# Patient Record
Sex: MALE | Race: WHITE | Employment: FULL TIME | URBAN - METROPOLITAN AREA
[De-identification: names, ages, dates, MRNs, and addresses within clinical notes are randomized per-mention and may not be internally consistent; named-entity substitution may affect disease eponyms.]

---

## 2017-05-03 ENCOUNTER — TELEPHONE (OUTPATIENT)
Dept: SURGERY | Age: 57
End: 2017-05-03

## 2017-05-03 NOTE — TELEPHONE ENCOUNTER
Received referral notes from Dr. Chon Johansen. Called and spoke to Mr. Marilee Carr regarding setting up appointment for evaluation of hernia. He is going to call back this afternoon to schedule.

## 2017-08-11 NOTE — TELEPHONE ENCOUNTER
Requested Prescriptions     Pending Prescriptions Disp Refills    valsartan-hydroCHLOROthiazide (DIOVAN-HCT) 320-25 mg per tablet [Pharmacy Med Name: VALSARTAN-HCTZ 320-25 MG TAB] 30 Tab 1     Sig: TAKE ONE TABLET BY MOUTH DAILY       Last Refill: 5-17-17  Last MZSXV:8-33-99

## 2017-08-13 RX ORDER — VALSARTAN AND HYDROCHLOROTHIAZIDE 320; 25 MG/1; MG/1
TABLET, FILM COATED ORAL
Qty: 30 TAB | Refills: 1 | Status: SHIPPED | OUTPATIENT
Start: 2017-08-13 | End: 2017-10-17 | Stop reason: SDUPTHER

## 2017-09-21 PROBLEM — I10 ESSENTIAL HYPERTENSION: Status: ACTIVE | Noted: 2017-09-21

## 2017-09-21 PROBLEM — K76.0 NAFLD (NONALCOHOLIC FATTY LIVER DISEASE): Status: ACTIVE | Noted: 2017-09-21

## 2017-09-21 PROBLEM — M79.7 FIBROMYALGIA: Status: ACTIVE | Noted: 2017-09-21

## 2017-09-21 PROBLEM — E78.5 DYSLIPIDEMIA: Status: ACTIVE | Noted: 2017-09-21

## 2017-09-21 PROBLEM — K63.5 COLORECTAL POLYPS: Status: ACTIVE | Noted: 2017-09-21

## 2017-09-21 PROBLEM — K21.00 GERD WITH ESOPHAGITIS: Status: ACTIVE | Noted: 2017-09-21

## 2017-09-21 PROBLEM — K62.1 COLORECTAL POLYPS: Status: ACTIVE | Noted: 2017-09-21

## 2017-09-21 PROBLEM — R79.89 LOW TESTOSTERONE: Status: ACTIVE | Noted: 2017-09-21

## 2017-09-21 PROBLEM — G43.909 MIGRAINE HEADACHE: Status: ACTIVE | Noted: 2017-09-21

## 2017-09-25 RX ORDER — BUPROPION HYDROCHLORIDE 150 MG/1
150 TABLET ORAL
COMMUNITY
End: 2018-07-09 | Stop reason: ALTCHOICE

## 2017-09-25 RX ORDER — AMLODIPINE BESYLATE 5 MG/1
5 TABLET ORAL DAILY
COMMUNITY
End: 2018-05-29 | Stop reason: SDUPTHER

## 2017-09-25 RX ORDER — AMITRIPTYLINE HYDROCHLORIDE 50 MG/1
TABLET, FILM COATED ORAL
COMMUNITY
End: 2017-10-03 | Stop reason: SDUPTHER

## 2017-09-29 ENCOUNTER — OFFICE VISIT (OUTPATIENT)
Dept: INTERNAL MEDICINE CLINIC | Age: 57
End: 2017-09-29

## 2017-09-29 VITALS
WEIGHT: 172 LBS | TEMPERATURE: 97.3 F | BODY MASS INDEX: 26.07 KG/M2 | HEIGHT: 68 IN | SYSTOLIC BLOOD PRESSURE: 126 MMHG | DIASTOLIC BLOOD PRESSURE: 76 MMHG

## 2017-09-29 DIAGNOSIS — J01.00 ACUTE MAXILLARY SINUSITIS, RECURRENCE NOT SPECIFIED: ICD-10-CM

## 2017-09-29 DIAGNOSIS — M79.7 FIBROMYALGIA: Primary | ICD-10-CM

## 2017-09-29 RX ORDER — AZITHROMYCIN 250 MG/1
250 TABLET, FILM COATED ORAL SEE ADMIN INSTRUCTIONS
Qty: 6 TAB | Refills: 0 | Status: SHIPPED | OUTPATIENT
Start: 2017-09-29 | End: 2017-11-22 | Stop reason: ALTCHOICE

## 2017-09-29 NOTE — PROGRESS NOTES
Shira Alberto is a 59 y.o. male presenting for Sinus Infection  . 1. Have you been to the ER, urgent care clinic since your last visit? Hospitalized since your last visit? No    2. Have you seen or consulted any other health care providers outside of the [facility] since your last visit? Include any pap smears or colon screening. No    No flowsheet data found. No flowsheet data found. PHQ over the last two weeks 9/29/2017   Little interest or pleasure in doing things Not at all   Feeling down, depressed or hopeless Not at all   Total Score PHQ 2 0       There are no discontinued medications.

## 2017-09-29 NOTE — PATIENT INSTRUCTIONS
Fibromyalgia: Care Instructions  Your Care Instructions  Fibromyalgia is a painful condition that is not completely understood by medical experts. The cause of fibromyalgia is not known. It can make you feel tired and ache all over. It causes tender spots at specific points of the body that hurt only when you press on them. You may have trouble sleeping, as well as other symptoms. These problems can upset your work and home life. Symptoms tend to come and go, although they may never go away completely. Fibromyalgia does not harm your muscles, joints, or organs. Follow-up care is a key part of your treatment and safety. Be sure to make and go to all appointments, and call your doctor if you are having problems. It's also a good idea to know your test results and keep a list of the medicines you take. How can you care for yourself at home? · Exercise often. Walk, swim, or bike to help with pain and sleep problems and to make you feel better. · Try to get a good night's sleep. Go to bed and get up at the same time each day, whether you feel rested or not. Make sure you have a good mattress and pillow. · Reduce stress. Avoid things that cause you stress, if you can. If not, work at making them less stressful. Learn to use biofeedback, guided imagery, meditation, or other methods to relax. · Make healthy changes. Eat a balanced diet, quit smoking, and limit alcohol and caffeine. · Use a heating pad set on low or take warm baths or showers for pain. Using cold packs for up to 20 minutes at a time can also relieve pain. Put a thin cloth between the cold pack and your skin. A gentle massage might help too. · Be safe with medicines. Take your medicines exactly as prescribed. Call your doctor if you think you are having a problem with your medicine. Your doctor may talk to you about taking antidepressant medicines. These medicines may improve sleep, relieve pain, and in some cases treat depression.   · Learn about fibromyalgia. This makes coping easier. Then, take an active role in your treatment. · Think about joining a support group with others who have fibromyalgia to learn more and get support. When should you call for help? Watch closely for changes in your health, and be sure to contact your doctor if:  · You feel sad, helpless, or hopeless; lose interest in things you used to enjoy; or have other symptoms of depression. · Your fibromyalgia symptoms get worse. Where can you learn more? Go to http://vargas-tod.info/. Enter V003 in the search box to learn more about \"Fibromyalgia: Care Instructions. \"  Current as of: October 14, 2016  Content Version: 11.3  © 4848-9262 [employer]. Care instructions adapted under license by [employer] (which disclaims liability or warranty for this information). If you have questions about a medical condition or this instruction, always ask your healthcare professional. [address] any warranty or liability for your use of this information.

## 2017-09-29 NOTE — MR AVS SNAPSHOT
Visit Information Date & Time Provider Department Dept. Phone Encounter #  
 9/29/2017  8:40 AM Arron Gaines, Edgar Trujillo [id number] Follow-up Instructions Return for As previously scheduled. Upcoming Health Maintenance Date Due Hepatitis C Screening 1960 DTaP/Tdap/Td series (1 - Tdap) 8/18/1979 FOBT Q 1 YEAR AGE 50-75 8/18/2008 INFLUENZA AGE 9 TO ADULT 8/1/2017 Allergies as of 9/29/2017  Review Complete On: 9/29/2017 By: Arron Gaines MD  
  
 Severity Noted Reaction Type Reactions Penicillins  09/25/2017    Unknown (comments) Current Immunizations  Never Reviewed Name Date  
 TB Skin Test (PPD) 8/12/2014 Not reviewed this visit You Were Diagnosed With   
  
 Codes Comments Fibromyalgia    -  Primary ICD-10-CM: M79.7 ICD-9-CM: 729.1 Acute maxillary sinusitis, recurrence not specified     ICD-10-CM: J01.00 ICD-9-CM: 461.0 Vitals BP Temp Height(growth percentile) Weight(growth percentile) BMI Smoking Status 126/76 (BP 1 Location: Right arm, BP Patient Position: Sitting) 97.3 °F (36.3 °C) (Oral) 5' 8\" (1.727 m) 172 lb (78 kg) 26.15 kg/m2 Never Smoker BMI and BSA Data Body Mass Index Body Surface Area  
 26.15 kg/m 2 1.93 m 2 Preferred Pharmacy Pharmacy Name Phone [facility] [address] [phone] Your Updated Medication List  
  
   
This list is accurate as of: 9/29/17  8:41 AM.  Always use your most recent med list.  
  
  
  
  
 amitriptyline 50 mg tablet Commonly known as:  ELAVIL Take  by mouth nightly. azithromycin 250 mg tablet Commonly known as:  Maria E Maudlin Take 1 Tab by mouth See Admin Instructions. milnacipran 12.5 mg (5)-25 mg(8)-50 mg(42) Dspk Commonly known as:  Sanju Josue Take 1 Package by mouth daily. NORVASC 5 mg tablet Generic drug:  amLODIPine Take 5 mg by mouth daily. valsartan-hydroCHLOROthiazide 320-25 mg per tablet Commonly known as:  DIOVAN-HCT  
TAKE ONE TABLET BY MOUTH DAILY WELLBUTRIN  mg tablet Generic drug:  buPROPion XL Take 150 mg by mouth every morning. Prescriptions Sent to Pharmacy Refills  
 milnacipran (SAVELLA) 12.5 mg (5)-25 mg(8)-50 mg(42) DsPk 0 Sig: Take 1 Package by mouth daily. Class: Normal  
 Pharmacy: [address] [place] [address]  [address] Ph #: [phone] Route: Oral  
 azithromycin (ZITHROMAX) 250 mg tablet 0 Sig: Take 1 Tab by mouth See Admin Instructions. Class: Normal  
 Pharmacy: [address] [place] [address]  [address] Ph #: [phone] Route: Oral  
  
Follow-up Instructions Return for As previously scheduled. Patient Instructions Fibromyalgia: Care Instructions Your Care Instructions Fibromyalgia is a painful condition that is not completely understood by medical experts. The cause of fibromyalgia is not known. It can make you feel tired and ache all over. It causes tender spots at specific points of the body that hurt only when you press on them. You may have trouble sleeping, as well as other symptoms. These problems can upset your work and home life. Symptoms tend to come and go, although they may never go away completely. Fibromyalgia does not harm your muscles, joints, or organs. Follow-up care is a key part of your treatment and safety. Be sure to make and go to all appointments, and call your doctor if you are having problems. It's also a good idea to know your test results and keep a list of the medicines you take. How can you care for yourself at home? · Exercise often. Walk, swim, or bike to help with pain and sleep problems and to make you feel better. · Try to get a good night's sleep. Go to bed and get up at the same time each day, whether you feel rested or not. Make sure you have a good mattress and pillow. · Reduce stress. Avoid things that cause you stress, if you can. If not, work at making them less stressful. Learn to use biofeedback, guided imagery, meditation, or other methods to relax. · Make healthy changes. Eat a balanced diet, quit smoking, and limit alcohol and caffeine. · Use a heating pad set on low or take warm baths or showers for pain. Using cold packs for up to 20 minutes at a time can also relieve pain. Put a thin cloth between the cold pack and your skin. A gentle massage might help too. · Be safe with medicines. Take your medicines exactly as prescribed. Call your doctor if you think you are having a problem with your medicine. Your doctor may talk to you about taking antidepressant medicines. These medicines may improve sleep, relieve pain, and in some cases treat depression. · Learn about fibromyalgia. This makes coping easier. Then, take an active role in your treatment. · Think about joining a support group with others who have fibromyalgia to learn more and get support. When should you call for help? Watch closely for changes in your health, and be sure to contact your doctor if: 
· You feel sad, helpless, or hopeless; lose interest in things you used to enjoy; or have other symptoms of depression. · Your fibromyalgia symptoms get worse. Where can you learn more? Go to http://vargas-tod.info/. Enter V003 in the search box to learn more about \"Fibromyalgia: Care Instructions. \" Current as of: October 14, 2016 Content Version: 11.3 © 8484-4712 [employer]. Care instructions adapted under license by [employer] (which disclaims liability or warranty for this information).  If you have questions about a medical condition or this instruction, always ask your healthcare professional. [place] [address] any warranty or liability for your use of this information. Introducing [facility]! Jaye Larkin introduces [employer] patient portal. Now you can access parts of your medical record, email your doctor's office, and request medication refills online. 1. In your internet browser, go to [email or web address] 2. Click on the First Time User? Click Here link in the Sign In box. You will see the New Member Sign Up page. 3. Enter your [employer] Access Code exactly as it appears below. You will not need to use this code after youve completed the sign-up process. If you do not sign up before the expiration date, you must request a new code. · [employer] Access Code: [email or web address] Expires: 12/28/2017  8:23 AM 
 
4. Enter the last four digits of your Social Security Number (xxxx) and Date of Birth (mm/dd/yyyy) as indicated and click Submit. You will be taken to the next sign-up page. 5. Create a [employer] ID. This will be your [employer] login ID and cannot be changed, so think of one that is secure and easy to remember. 6. Create a [employer] password. You can change your password at any time. 7. Enter your Password Reset Question and Answer. This can be used at a later time if you forget your password. 8. Enter your e-mail address. You will receive e-mail notification when new information is available in [address] 9. Click Sign Up. You can now view and download portions of your medical record. 10. Click the Download Summary menu link to download a portable copy of your medical information. If you have questions, please visit the Frequently Asked Questions section of the [employer] website. Remember, [employer] is NOT to be used for urgent needs. For medical emergencies, dial 911. Now available from your iPhone and Android! Please provide this summary of care documentation to your next provider. Your primary care clinician is listed as INOCENTE Lynn. If you have any questions after today's visit, please call [phone].

## 2017-09-29 NOTE — PROGRESS NOTES
This note will not be viewable in [address] Burton Painting is a 59 y.o. male and presents with Sinus Infection  . Subjective:  Jerrica Martino presents to the office today with complaints of an upper respiratory infection with development of sinus congestion, maxillary discomfort and purulent sinus drainage. He has had some mild retro-orbital headaches. He denies sore throat or cough. He has had no fever or chills. There is been no neck stiffness or rash. Patient also complains of fibromyalgia. This is been a long standing problem that he has had and he had been on medication for this in the past.  The patient failed all the old traditional medications for fibromyalgia but did find great response with Savella. He was on this for a number of years until approximately 2 years ago when he lost his job and his insurance. He then was on [employer] ([employer]) and was unable to get [employer] through that insurance. The patient is now working for [employer] and has [employer] which he would like to get started back on it because he has pain \"everywhere\". The patient is never found as good a relief with any other medication at this point. Patient Active Problem List   Diagnosis Code    Colorectal polyps K63.5    Dyslipidemia E78.5    GERD with esophagitis K21.0    Fibromyalgia M79.7    Essential hypertension I10    Low testosterone E29.1    Migraine headache G43.909    NAFLD (nonalcoholic fatty liver disease) K76.0     Past Surgical History:   Procedure Laterality Date    ABDOMEN SURGERY PROC UNLISTED      HX APPENDECTOMY      HX COLONOSCOPY       Allergies   Allergen Reactions    Penicillins Unknown (comments)     Current Outpatient Prescriptions   Medication Sig Dispense Refill    milnacipran (SAVELLA) 12.5 mg (5)-25 mg(8)-50 mg(42) DsPk Take 1 Package by mouth daily. 1 Package 0    azithromycin (ZITHROMAX) 250 mg tablet Take 1 Tab by mouth See Admin Instructions.  6 Tab 0    amLODIPine (NORVASC) 5 mg tablet Take 5 mg by mouth daily.  amitriptyline (ELAVIL) 50 mg tablet Take  by mouth nightly.  buPROPion XL (WELLBUTRIN XL) 150 mg tablet Take 150 mg by mouth every morning.  valsartan-hydroCHLOROthiazide (DIOVAN-HCT) 320-25 mg per tablet TAKE ONE TABLET BY MOUTH DAILY 30 Tab 1     Social History     Social History    Marital status:      Spouse name: N/A    Number of children: N/A    Years of education: N/A     Social History Main Topics    Smoking status: Never Smoker    Smokeless tobacco: Never Used    Alcohol use No    Drug use: None    Sexual activity: Not Asked     Other Topics Concern    None     Social History Narrative     Family History   Problem Relation Age of Onset    Heart Disease Father     Heart Disease Paternal Grandfather        Review of Systems  Constitutional: negative for fevers, chills, anorexia and weight loss  Eyes:   negative for visual disturbance and irritation  ENT:   Positive for sinus congestion, maxillary discomfort, purulent psotnasal draingage and throat irritation  Respiratory:  negative for cough, hemoptysis, dyspnea,wheezing  CV:   negative for chest pain, palpitations, lower extremity edema  GI:   negative for nausea, vomiting, diarrhea, abdominal pain,melena  Integumentary: negative for rash and pruritus  Neurological:  negative for headaches, dizziness, vertigo, memory problems and gait       Objective:  Visit Vitals    /76 (BP 1 Location: Right arm, BP Patient Position: Sitting)    Temp 97.3 °F (36.3 °C) (Oral)    Ht 5' 8\" (1.727 m)    Wt 172 lb (78 kg)    BMI 26.15 kg/m2     Body mass index is 26.15 kg/(m^2). Physical Exam:   General appearance - alert, well appearing, and in no distress  Mental status - alert, oriented to person, place, and time  EYE-TRISTON, EOMI, sclera clear. No lid swelling or purulent drainage  ENT- TM's clear without A/F level.  Pharynx slightly erythematous with drainage noted  Nose - normal and patent, no erythema,  Neck - supple, no significant adenopathy   Chest - clear to auscultation, no wheezes, rales or rhonchi, symmetric air entry   Heart - normal rate, regular rhythm, normal S1, S2, no murmurs, rubs, clicks or gallops   Skin-No rash appreciated  Neuro -alert, oriented, normal speech, no focal findings. Assessment/Plan:  Diagnoses and all orders for this visit:    Fibromyalgia  -     milnacipran (SAVELLA) 12.5 mg (5)-25 mg(8)-50 mg(42) DsPk; Take 1 Package by mouth daily. , Normal, Disp-1 Package, R-0    Acute maxillary sinusitis, recurrence not specified  -     azithromycin (ZITHROMAX) 250 mg tablet; Take 1 Tab by mouth See Admin Instructions. , Normal, Disp-6 Tab, R-0        Other Instructions:  Mucinex as directed    Increase po fluids    Will restart Savella titration pack    Follow-up Disposition:  Return for As previously scheduled. I have reviewed with the patient details of the assessment and plan and all questions were answered. Relevent patient education was performed. An After Visit Summary was printed and given to the patient.     Aide Alvarado MD

## 2017-10-03 RX ORDER — AMITRIPTYLINE HYDROCHLORIDE 50 MG/1
TABLET, FILM COATED ORAL
Qty: 30 TAB | Refills: 11 | Status: SHIPPED | OUTPATIENT
Start: 2017-10-03 | End: 2018-10-10 | Stop reason: SDUPTHER

## 2017-10-03 NOTE — TELEPHONE ENCOUNTER
Requested Prescriptions     Pending Prescriptions Disp Refills    amitriptyline (ELAVIL) 50 mg tablet [Pharmacy Med Name: AMITRIPTYLINE HCL 50 MG TAB] 30 Tab 11     Sig: TAKE 1 TABLET BY MOUTH AT BEDTIME       Last Refill: 9/7/17  Last visit:9/29/2017

## 2017-10-17 RX ORDER — VALSARTAN AND HYDROCHLOROTHIAZIDE 320; 25 MG/1; MG/1
TABLET, FILM COATED ORAL
Qty: 30 TAB | Refills: 0 | Status: SHIPPED | OUTPATIENT
Start: 2017-10-17 | End: 2017-11-20 | Stop reason: SDUPTHER

## 2017-10-17 NOTE — TELEPHONE ENCOUNTER
Requested Prescriptions     Pending Prescriptions Disp Refills    valsartan-hydroCHLOROthiazide (DIOVAN-HCT) 320-25 mg per tablet [Pharmacy Med Name: VALSARTAN-HCTZ 320-25 MG TAB] 30 Tab 0     Sig: TAKE ONE TABLET BY MOUTH DAILY       Last Refill: 9-14-17  Last visit:9/29/2017

## 2017-11-20 RX ORDER — VALSARTAN AND HYDROCHLOROTHIAZIDE 320; 25 MG/1; MG/1
TABLET, FILM COATED ORAL
Qty: 30 TAB | Refills: 0 | Status: SHIPPED | OUTPATIENT
Start: 2017-11-20 | End: 2017-12-20 | Stop reason: SDUPTHER

## 2017-11-20 NOTE — TELEPHONE ENCOUNTER
Requested Prescriptions     Pending Prescriptions Disp Refills    valsartan-hydroCHLOROthiazide (DIOVAN-HCT) 320-25 mg per tablet [Pharmacy Med Name: VALSARTAN-HCTZ 320-25 MG TAB] 30 Tab 0     Sig: TAKE ONE TABLET BY MOUTH DAILY       Last Refill:10-17-17  Last visit:9/29/2017

## 2017-11-22 ENCOUNTER — OFFICE VISIT (OUTPATIENT)
Dept: INTERNAL MEDICINE CLINIC | Age: 57
End: 2017-11-22

## 2017-11-22 VITALS
DIASTOLIC BLOOD PRESSURE: 81 MMHG | OXYGEN SATURATION: 97 % | HEART RATE: 93 BPM | WEIGHT: 171 LBS | HEIGHT: 68 IN | RESPIRATION RATE: 16 BRPM | TEMPERATURE: 98.2 F | BODY MASS INDEX: 25.91 KG/M2 | SYSTOLIC BLOOD PRESSURE: 117 MMHG

## 2017-11-22 DIAGNOSIS — D17.1 LIPOMA OF CHEST WALL: Primary | ICD-10-CM

## 2017-11-22 NOTE — MR AVS SNAPSHOT
Visit Information Date & Time Provider Department Dept. Phone Encounter #  
 11/22/2017  9:10 AM Ty Barnhart, Edgar Trujillo [id number] Follow-up Instructions Return for As previously scheduled. Upcoming Health Maintenance Date Due Hepatitis C Screening 1960 DTaP/Tdap/Td series (1 - Tdap) 8/18/1979 FOBT Q 1 YEAR AGE 50-75 8/18/2008 Influenza Age 5 to Adult 8/1/2017 Allergies as of 11/22/2017  Review Complete On: 11/22/2017 By: Ty Barnhart MD  
  
 Severity Noted Reaction Type Reactions Penicillins  09/25/2017    Unknown (comments) Current Immunizations  Never Reviewed Name Date Influenza Vaccine (Quadrivalent) 10/25/2017 TB Skin Test (PPD) 8/12/2014 Not reviewed this visit You Were Diagnosed With   
  
 Codes Comments Lipoma of chest wall    -  Primary ICD-10-CM: D17.39 
ICD-9-CM: 214.8 Vitals BP Pulse Temp Resp Height(growth percentile) Weight(growth percentile) 117/81 (BP 1 Location: Left arm, BP Patient Position: Sitting) 93 98.2 °F (36.8 °C) (Oral) 16 5' 8\" (1.727 m) 171 lb (77.6 kg) SpO2 BMI Smoking Status 97% 26 kg/m2 Never Smoker Vitals History BMI and BSA Data Body Mass Index Body Surface Area  
 26 kg/m 2 1.93 m 2 Preferred Pharmacy Pharmacy Name Phone [facility]ie Antoine [address] [phone] Your Updated Medication List  
  
   
This list is accurate as of: 11/22/17  9:12 AM.  Always use your most recent med list.  
  
  
  
  
 amitriptyline 50 mg tablet Commonly known as:  ELAVIL TAKE 1 TABLET BY MOUTH AT BEDTIME Milnacipran 50 mg tablet Commonly known as:  Marrian Yenny Take 1 Tab by mouth two (2) times a day. NORVASC 5 mg tablet Generic drug:  amLODIPine Take 5 mg by mouth daily. valsartan-hydroCHLOROthiazide 320-25 mg per tablet Commonly known as:  DIOVAN-HCT  
TAKE ONE TABLET BY MOUTH DAILY WELLBUTRIN  mg tablet Generic drug:  buPROPion XL Take 150 mg by mouth every morning. Follow-up Instructions Return for As previously scheduled. Introducing [facility]! Margarita Donald introduces [employer] patient portal. Now you can access parts of your medical record, email your doctor's office, and request medication refills online. 1. In your internet browser, go to [email or web address] 2. Click on the First Time User? Click Here link in the Sign In box. You will see the New Member Sign Up page. 3. Enter your [employer] Access Code exactly as it appears below. You will not need to use this code after youve completed the sign-up process. If you do not sign up before the expiration date, you must request a new code. · [employer] Access Code: [email or web address] Expires: 12/28/2017  7:23 AM 
 
4. Enter the last four digits of your Social Security Number (xxxx) and Date of Birth (mm/dd/yyyy) as indicated and click Submit. You will be taken to the next sign-up page. 5. Create a [employer] ID. This will be your [employer] login ID and cannot be changed, so think of one that is secure and easy to remember. 6. Create a [employer] password. You can change your password at any time. 7. Enter your Password Reset Question and Answer. This can be used at a later time if you forget your password. 8. Enter your e-mail address. You will receive e-mail notification when new information is available in [address] 9. Click Sign Up. You can now view and download portions of your medical record. 10. Click the Download Summary menu link to download a portable copy of your medical information. If you have questions, please visit the Frequently Asked Questions section of the [employer] website.  Remember, [employer] is NOT to be used for urgent needs. For medical emergencies, dial 911. Now available from your iPhone and Android! Please provide this summary of care documentation to your next provider. Your primary care clinician is listed as INOCENTE Lynn. If you have any questions after today's visit, please call [phone].

## 2017-11-22 NOTE — PROGRESS NOTES
This note will not be viewable in [address] Subjective:     Patient presents to the office today with complaints of a small mass along the right lateral chest wall that his wife recently noticed. He has never seen it there before. He is having no pain    Past Medical History:   Diagnosis Date    Colorectal polyps 9/21/2017    Dyslipidemia 9/21/2017    Essential hypertension 9/21/2017    Fibromyalgia 9/21/2017    GERD with esophagitis 9/21/2017    Low testosterone 9/21/2017    Migraine headache 9/21/2017    NAFLD (nonalcoholic fatty liver disease) 9/21/2017     Past Surgical History:   Procedure Laterality Date    ABDOMEN SURGERY PROC UNLISTED      HX APPENDECTOMY      HX COLONOSCOPY       Allergies   Allergen Reactions    Penicillins Unknown (comments)     Current Outpatient Prescriptions   Medication Sig Dispense Refill    valsartan-hydroCHLOROthiazide (DIOVAN-HCT) 320-25 mg per tablet TAKE ONE TABLET BY MOUTH DAILY 30 Tab 0    Milnacipran (SAVELLA) 50 mg tablet Take 1 Tab by mouth two (2) times a day. 60 Tab 3    amitriptyline (ELAVIL) 50 mg tablet TAKE 1 TABLET BY MOUTH AT BEDTIME 30 Tab 11    amLODIPine (NORVASC) 5 mg tablet Take 5 mg by mouth daily.  buPROPion XL (WELLBUTRIN XL) 150 mg tablet Take 150 mg by mouth every morning. Social History     Social History    Marital status:      Spouse name: N/A    Number of children: N/A    Years of education: N/A     Social History Main Topics    Smoking status: Never Smoker    Smokeless tobacco: Never Used    Alcohol use No    Drug use: None    Sexual activity: Not Asked     Other Topics Concern    None     Social History Narrative     Family History   Problem Relation Age of Onset    Heart Disease Father     Heart Disease Paternal Grandfather        Review of Systems:  GEN: no weight loss, weight gain, fatigue or night sweats  CV: no PND, orthopnea, or palpitations.  Positive for right chest wall mass  Resp: no dyspnea on exertion, no cough  Abd: no nausea, vomiting or diarrhea    ROS otherwise negative      Objective:     Visit Vitals    /81 (BP 1 Location: Left arm, BP Patient Position: Sitting)    Pulse 93    Temp 98.2 °F (36.8 °C) (Oral)    Resp 16    Ht 5' 8\" (1.727 m)    Wt 171 lb (77.6 kg)    SpO2 97%    BMI 26 kg/m2     Body mass index is 26 kg/(m^2). General:   alert, cooperative and no distress   Heart: S1 and S2 normal,no murmurs noted. There is a soft, rubbery marble sized soft tissue mass along the right lateral chest wall which is freely movable and nontender. Lungs: Clear to auscultation bilaterally, no increased work of breathing   Abdomen: Soft, nontender. Normal bowel sounds     Physical exam otherwise negative         Assessment/Plan:     Diagnoses and all orders for this visit:    Lipoma of chest wall        Other instructions: The patient is reassured that this is a benign lesion. I have instructed him to keep an eye on it and should it show signs of increasing in size or irritation will refer to surgery for excision    Follow-up Disposition:  Return for As previously scheduled.     Keren Ramirez MD

## 2017-12-20 RX ORDER — VALSARTAN AND HYDROCHLOROTHIAZIDE 320; 25 MG/1; MG/1
TABLET, FILM COATED ORAL
Qty: 30 TAB | Refills: 0 | Status: SHIPPED | OUTPATIENT
Start: 2017-12-20 | End: 2018-01-24 | Stop reason: SDUPTHER

## 2017-12-20 NOTE — TELEPHONE ENCOUNTER
Requested Prescriptions     Pending Prescriptions Disp Refills    valsartan-hydroCHLOROthiazide (DIOVAN-HCT) 320-25 mg per tablet [Pharmacy Med Name: VALSARTAN-HCTZ 320-25 MG TAB] 30 Tab 0     Sig: TAKE ONE TABLET BY MOUTH DAILY       Last Refill: 11-20-17  Last visit:11/22/2017

## 2018-03-02 ENCOUNTER — OFFICE VISIT (OUTPATIENT)
Dept: INTERNAL MEDICINE CLINIC | Age: 58
End: 2018-03-02

## 2018-03-02 VITALS
DIASTOLIC BLOOD PRESSURE: 70 MMHG | WEIGHT: 173.2 LBS | TEMPERATURE: 98.1 F | HEIGHT: 68 IN | SYSTOLIC BLOOD PRESSURE: 118 MMHG | BODY MASS INDEX: 26.25 KG/M2

## 2018-03-02 DIAGNOSIS — H69.83 DYSFUNCTION OF BOTH EUSTACHIAN TUBES: Primary | ICD-10-CM

## 2018-03-02 RX ORDER — FLUTICASONE PROPIONATE 50 MCG
2 SPRAY, SUSPENSION (ML) NASAL DAILY
Qty: 1 BOTTLE | Refills: 0
Start: 2018-03-02 | End: 2018-07-09 | Stop reason: ALTCHOICE

## 2018-03-02 RX ORDER — PSEUDOEPHEDRINE HCL 120 MG/1
120 TABLET, FILM COATED, EXTENDED RELEASE ORAL DAILY
Qty: 15 TAB | Refills: 0
Start: 2018-03-02 | End: 2018-07-09 | Stop reason: ALTCHOICE

## 2018-03-02 RX ORDER — OXYMETAZOLINE HCL 0.05 %
2 SPRAY, NON-AEROSOL (ML) NASAL 2 TIMES DAILY
Qty: 1 EACH | Refills: 0
Start: 2018-03-02 | End: 2018-03-05

## 2018-03-02 NOTE — PATIENT INSTRUCTIONS
Middle Ear Fluid: Care Instructions  Your Care Instructions    Fluid often builds up inside the ear during a cold or allergies. Usually the fluid drains away, but sometimes a small tube in the ear, called the eustachian tube, stays blocked for months. Symptoms of fluid buildup may include:  · Popping, ringing, or a feeling of fullness or pressure in the ear. · Trouble hearing. · Balance problems and dizziness. In most cases, you can treat yourself at home. Follow-up care is a key part of your treatment and safety. Be sure to make and go to all appointments, and call your doctor if you are having problems. It's also a good idea to know your test results and keep a list of the medicines you take. How can you care for yourself at home? · In most cases, the fluid clears up within a few months without treatment. You may need more tests if the fluid does not clear up after 3 months. · If your doctor prescribed antibiotics, take them as directed. Do not stop taking them just because you feel better. You need to take the full course of antibiotics. When should you call for help? Call your doctor now or seek immediate medical care if:  ? · You have symptoms of infection, such as:  ¨ Increased pain, swelling, warmth, or redness. ¨ Pus draining from the area. ¨ A fever. ? Watch closely for changes in your health, and be sure to contact your doctor if:  ? · You notice changes in hearing. ? · You do not get better as expected. Where can you learn more? Go to http://vargas-tod.info/. Enter W155 in the search box to learn more about \"Middle Ear Fluid: Care Instructions. \"  Current as of: May 12, 2017  Content Version: 11.4  © 8384-0844 [employer]. Care instructions adapted under license by [employer] (which disclaims liability or warranty for this information).  If you have questions about a medical condition or this instruction, always ask your healthcare professional. [address] any warranty or liability for your use of this information.

## 2018-03-02 NOTE — PROGRESS NOTES
This note will not be viewable in [address] Subjective: The patient presents to the office today with complaints of in the ability to hear out of his right greater than left ear. Patient's history is remarkable for an upper respiratory infection with sinus congestion and drainage [facility] which [facility] Z-Tung on February 20. Patient was having problems with his right ear at that time as well he did not improve over a 3 day timeframe and he was subsequently placed on Omnicef. He has continued that until now and he is still having problems with right greater than left hearing. He is noted no tinnitus but is been having some vertigo. He denies any fevers chills or headaches. Past Medical History:   Diagnosis Date    Colorectal polyps 9/21/2017    Dyslipidemia 9/21/2017    Essential hypertension 9/21/2017    Fibromyalgia 9/21/2017    GERD with esophagitis 9/21/2017    Low testosterone 9/21/2017    Migraine headache 9/21/2017    NAFLD (nonalcoholic fatty liver disease) 9/21/2017     Past Surgical History:   Procedure Laterality Date    ABDOMEN SURGERY PROC UNLISTED      HX APPENDECTOMY      HX COLONOSCOPY       Allergies   Allergen Reactions    Penicillins Unknown (comments)     Current Outpatient Prescriptions   Medication Sig Dispense Refill    pseudoephedrine CR (SUDAFED 12 HOUR) 120 mg CR tablet Take 1 Tab by mouth daily. 15 Tab 0    fluticasone (FLONASE) 50 mcg/actuation nasal spray 2 Sprays by Both Nostrils route daily. 1 Bottle 0    oxymetazoline (AFRIN, OXYMETAZOLINE,) 0.05 % nasal spray 2 Sprays by Both Nostrils route two (2) times a day for 3 days. 1 Each 0    valsartan-hydroCHLOROthiazide (DIOVAN-HCT) 320-25 mg per tablet TAKE ONE TABLET BY MOUTH DAILY 30 Tab 3    Milnacipran (SAVELLA) 50 mg tablet Take 1 Tab by mouth two (2) times a day.  60 Tab 3    amitriptyline (ELAVIL) 50 mg tablet TAKE 1 TABLET BY MOUTH AT BEDTIME 30 Tab 11    amLODIPine (NORVASC) 5 mg tablet Take 5 mg by mouth daily.  buPROPion XL (WELLBUTRIN XL) 150 mg tablet Take 150 mg by mouth every morning. Social History     Social History    Marital status:      Spouse name: N/A    Number of children: N/A    Years of education: N/A     Social History Main Topics    Smoking status: Never Smoker    Smokeless tobacco: Never Used    Alcohol use No    Drug use: None    Sexual activity: Not Asked     Other Topics Concern    None     Social History Narrative     Family History   Problem Relation Age of Onset    Heart Disease Father     Heart Disease Paternal Grandfather        Review of Systems:  GEN: no weight loss, weight gain, fatigue or night sweats  ENT: Complains of hearing loss right greater than left along with vertigo  CV: no PND, orthopnea, or palpitations  Resp: no dyspnea on exertion, no cough  Abd: no nausea, vomiting or diarrhea  EXT: denies edema, claudication  Endocrine: no hair loss, excessive thirst or polyuria  Neurological ROS: no TIA or stroke symptoms  ROS otherwise negative      Objective:     Visit Vitals    /70 (BP 1 Location: Right arm, BP Patient Position: Sitting)    Temp 98.1 °F (36.7 °C) (Oral)    Ht 5' 8\" (1.727 m)    Wt 173 lb 3.2 oz (78.6 kg)    BMI 26.33 kg/m2     Body mass index is 26.33 kg/(m^2). General:   alert, cooperative and no distress   ENT:  Both ear canals are clear. The TMs are not inflamed. There is fluid behind the right TM and the left TM is retracted. Mouth:  No oral lesions, no pharyngeal erythema, no exudates   Neck: Trachea midline, no thyromegaly, no bruits   Heart: S1 and S2 normal,no murmurs noted    Lungs: Clear to auscultation bilaterally, no increased work of breathing   Abdomen: Soft, nontender.   Normal bowel sounds   Extremities: No edema or cyanosis   Neuro: ..alert, oriented x3,speech normal in context and clarity, cranial nerves II-XII intact,motor strength: full proximally and distally,gait: normal  reflexes: full and symmetric     Physical exam otherwise negative         Assessment/Plan:     Diagnoses and all orders for this visit:    Dysfunction of both eustachian tubes  -     pseudoephedrine CR (SUDAFED 12 HOUR) 120 mg CR tablet; Take 1 Tab by mouth daily. , No Print, Disp-15 Tab, R-0  -     fluticasone (FLONASE) 50 mcg/actuation nasal spray; 2 Sprays by Both Nostrils route daily. , No Print, Disp-1 Bottle, R-0  -     oxymetazoline (AFRIN, OXYMETAZOLINE,) 0.05 % nasal spray; 2 Sprays by Both Nostrils route two (2) times a day for 3 days. , No Print, Disp-1 Each, R-0        Other instructions: The patient's ears do not appear infected. I believe he has eustachian tube dysfunction. He will start on a 12 hour Sudafed and use Afrin nasal spray twice a day for the first 3 days. In addition he will start Flonase on a twice daily basis. After a day or 2 I have asked him to try to clear his ears by holding his nose and blowing. If there is no improvement of the above we will give him a short course of oral prednisone    He is to return if there is any worsening. Follow-up Disposition:  Return if symptoms worsen or fail to improve.     Rosy Ludwig MD

## 2018-03-02 NOTE — MR AVS SNAPSHOT
[address] [place] [address] [address] [id number] [phone] Patient: Vasyl Gamez MRN: [id number] [facility] Files Visit Information Date & Time Provider Department Dept. Phone Encounter #  
 3/2/2018  9:30 AM Edgar Beaulieu Margoth Trujillo [id number] Follow-up Instructions Return if symptoms worsen or fail to improve. Upcoming Health Maintenance Date Due Hepatitis C Screening 1960 DTaP/Tdap/Td series (1 - Tdap) 8/18/1979 FOBT Q 1 YEAR AGE 50-75 8/18/2008 Allergies as of 3/2/2018  Review Complete On: 3/2/2018 By: Jhonny Parmar MD  
  
 Severity Noted Reaction Type Reactions Penicillins  09/25/2017    Unknown (comments) Current Immunizations  Never Reviewed Name Date Influenza Vaccine (Quadrivalent) 10/25/2017 TB Skin Test (PPD) 8/12/2014 Not reviewed this visit You Were Diagnosed With   
  
 Codes Comments Dysfunction of both eustachian tubes    -  Primary ICD-10-CM: T39.73 ICD-9-CM: 381.81 Vitals BP Temp Height(growth percentile) Weight(growth percentile) BMI Smoking Status 118/70 (BP 1 Location: Right arm, BP Patient Position: Sitting) 98.1 °F (36.7 °C) (Oral) 5' 8\" (1.727 m) 173 lb 3.2 oz (78.6 kg) 26.33 kg/m2 Never Smoker BMI and BSA Data Body Mass Index Body Surface Area  
 26.33 kg/m 2 1.94 m 2 Preferred Pharmacy Pharmacy Name Phone Lisbet Felix [address] [place] [address] Dr [place], [address] [place] [address] Mike Chino [phone] Your Updated Medication List  
  
   
This list is accurate as of 3/2/18  9:58 AM.  Always use your most recent med list.  
  
  
  
  
 amitriptyline 50 mg tablet Commonly known as:  ELAVIL TAKE 1 TABLET BY MOUTH AT BEDTIME  
  
 fluticasone 50 mcg/actuation nasal spray Commonly known as:  Myna Heller 2 Sprays by Both Nostrils route daily. Milnacipran 50 mg tablet Commonly known as:  Gagandeep Yarelis Take 1 Tab by mouth two (2) times a day. NORVASC 5 mg tablet Generic drug:  amLODIPine Take 5 mg by mouth daily. oxymetazoline 0.05 % nasal spray Commonly known as:  AFRIN (OXYMETAZOLINE) 2 Sprays by Both Nostrils route two (2) times a day for 3 days. pseudoephedrine  mg CR tablet Commonly known as:  SUDAFED 12 HOUR Take 1 Tab by mouth daily. valsartan-hydroCHLOROthiazide 320-25 mg per tablet Commonly known as:  DIOVAN-HCT  
TAKE ONE TABLET BY MOUTH DAILY WELLBUTRIN  mg tablet Generic drug:  buPROPion XL Take 150 mg by mouth every morning. Follow-up Instructions Return if symptoms worsen or fail to improve. Introducing [facility]! [facility] introduces [employer] patient portal. Now you can access parts of your medical record, email your doctor's office, and request medication refills online. 1. In your internet browser, go to [email or web address] 2. Click on the First Time User? Click Here link in the Sign In box. You will see the New Member Sign Up page. 3. Enter your [employer] Access Code exactly as it appears below. You will not need to use this code after youve completed the sign-up process. If you do not sign up before the expiration date, you must request a new code. · [employer] Access Code: [email or web address] Expires: 5/31/2018  9:29 AM 
 
4. Enter the last four digits of your Social Security Number (xxxx) and Date of Birth (mm/dd/yyyy) as indicated and click Submit. You will be taken to the next sign-up page. 5. Create a [employer] ID. This will be your [employer] login ID and cannot be changed, so think of one that is secure and easy to remember. 6. Create a [employer] password. You can change your password at any time. 7. Enter your Password Reset Question and Answer. This can be used at a later time if you forget your password. 8. Enter your e-mail address. You will receive e-mail notification when new information is available in [address] 9. Click Sign Up. You can now view and download portions of your medical record. 10. Click the Download Summary menu link to download a portable copy of your medical information. If you have questions, please visit the Frequently Asked Questions section of the [employer] website. Remember, [employer] is NOT to be used for urgent needs. For medical emergencies, dial 911. Now available from your iPhone and Android! Please provide this summary of care documentation to your next provider. Your primary care clinician is listed as INOCENTE Morris 21. If you have any questions after today's visit, please call [phone].

## 2018-03-02 NOTE — PROGRESS NOTES
Yumiko Peterson is a 59 y.o. male presenting for Ear Fullness  . 1. Have you been to the ER, urgent care clinic since your last visit? Hospitalized since your last visit? Yes When: 2-20-18 & 2-23-18 [facility] Reason for visit: ear infection. 2. Have you seen or consulted any other health care providers outside of the [address] [place] [address] since your last visit? Include any pap smears or colon screening. No    No flowsheet data found. No flowsheet data found. PHQ over the last two weeks 9/29/2017   Little interest or pleasure in doing things Not at all   Feeling down, depressed or hopeless Not at all   Total Score PHQ 2 0       There are no discontinued medications.

## 2018-03-08 ENCOUNTER — TELEPHONE (OUTPATIENT)
Dept: INTERNAL MEDICINE CLINIC | Age: 58
End: 2018-03-08

## 2018-03-16 ENCOUNTER — TELEPHONE (OUTPATIENT)
Dept: INTERNAL MEDICINE CLINIC | Age: 58
End: 2018-03-16

## 2018-03-16 DIAGNOSIS — H92.09 OTALGIA, UNSPECIFIED LATERALITY: Primary | ICD-10-CM

## 2018-03-16 NOTE — TELEPHONE ENCOUNTER
Jose Guzman called stating he is continuing to have ear pain & discomfort. He is now ready to speak to Dr. Eliana Goodman about going to see an ENT. Patient may be reached at [phone] (h).

## 2018-05-29 RX ORDER — AMLODIPINE BESYLATE 5 MG/1
TABLET ORAL
Qty: 30 TAB | Refills: 2 | Status: SHIPPED | OUTPATIENT
Start: 2018-05-29 | End: 2018-08-29 | Stop reason: SDUPTHER

## 2018-05-29 RX ORDER — VALSARTAN AND HYDROCHLOROTHIAZIDE 320; 25 MG/1; MG/1
TABLET, FILM COATED ORAL
Qty: 30 TAB | Refills: 2 | Status: SHIPPED | OUTPATIENT
Start: 2018-05-29 | End: 2018-08-30 | Stop reason: SDUPTHER

## 2018-06-18 RX ORDER — BUPROPION HYDROCHLORIDE 150 MG/1
TABLET, EXTENDED RELEASE ORAL
Qty: 60 TAB | Refills: 2 | Status: SHIPPED | OUTPATIENT
Start: 2018-06-18 | End: 2018-10-08 | Stop reason: SDUPTHER

## 2018-07-09 ENCOUNTER — OFFICE VISIT (OUTPATIENT)
Dept: INTERNAL MEDICINE CLINIC | Age: 58
End: 2018-07-09

## 2018-07-09 VITALS
TEMPERATURE: 98 F | DIASTOLIC BLOOD PRESSURE: 70 MMHG | OXYGEN SATURATION: 97 % | BODY MASS INDEX: 26.46 KG/M2 | WEIGHT: 174.6 LBS | HEIGHT: 68 IN | HEART RATE: 102 BPM | SYSTOLIC BLOOD PRESSURE: 118 MMHG

## 2018-07-09 DIAGNOSIS — M79.7 FIBROMYALGIA: Primary | ICD-10-CM

## 2018-07-09 NOTE — PATIENT INSTRUCTIONS
Fibromyalgia: Care Instructions Your Care Instructions Fibromyalgia is a painful condition that is not completely understood by medical experts. The cause of fibromyalgia is not known. It can make you feel tired and ache all over. It causes tender spots at specific points of the body that hurt only when you press on them. You may have trouble sleeping, as well as other symptoms. These problems can upset your work and home life. Symptoms tend to come and go, although they may never go away completely. Fibromyalgia does not harm your muscles, joints, or organs. Follow-up care is a key part of your treatment and safety. Be sure to make and go to all appointments, and call your doctor if you are having problems. It's also a good idea to know your test results and keep a list of the medicines you take. How can you care for yourself at home? · Exercise often. Walk, swim, or bike to help with pain and sleep problems and to make you feel better. · Try to get a good night's sleep. Go to bed and get up at the same time each day, whether you feel rested or not. Make sure you have a good mattress and pillow. · Reduce stress. Avoid things that cause you stress, if you can. If not, work at making them less stressful. Learn to use biofeedback, guided imagery, meditation, or other methods to relax. · Make healthy changes. Eat a balanced diet, quit smoking, and limit alcohol and caffeine. · Use a heating pad set on low or take warm baths or showers for pain. Using cold packs for up to 20 minutes at a time can also relieve pain. Put a thin cloth between the cold pack and your skin. A gentle massage might help too. · Be safe with medicines. Take your medicines exactly as prescribed. Call your doctor if you think you are having a problem with your medicine. Your doctor may talk to you about taking antidepressant medicines. These medicines may improve sleep, relieve pain, and in some cases treat depression.  
· Learn about fibromyalgia. This makes coping easier. Then, take an active role in your treatment. · Think about joining a support group with others who have fibromyalgia to learn more and get support. When should you call for help? Watch closely for changes in your health, and be sure to contact your doctor if: 
? · You feel sad, helpless, or hopeless; lose interest in things you used to enjoy; or have other symptoms of depression. ? · Your fibromyalgia symptoms get worse. Where can you learn more? Go to http://vargas-tod.info/. Enter V003 in the search box to learn more about \"Fibromyalgia: Care Instructions. \" Current as of: October 14, 2016 Content Version: 11.4 © 6927-7731 [employer]. Care instructions adapted under license by [employer] (which disclaims liability or warranty for this information). If you have questions about a medical condition or this instruction, always ask your healthcare professional. [address] any warranty or liability for your use of this information. Learning About Cutting Calories How do calories affect your weight? Food gives your body energy. Energy from the food you eat is measured in calories. This energy keeps your heart beating, your brain active, and your muscles working. Your body needs a certain number of calories each day. After your body uses the calories it needs, it stores extra calories as fat. To lose weight safely, you have to eat fewer calories while eating in a healthy way. How many calories do you need each day? The more active you are, the more calories you need. When you are less active, you need fewer calories. How many calories you need each day also depends on several things, including your age and whether you are male or female. Here are some general guidelines for adults: 
· Less active women and older adults need 1,600 to 2,000 calories each day.  
· Active women and less active men need 2,000 to 2,400 calories each day. · Active men need 2,400 to 3,000 calories each day. How can you cut calories and eat healthy meals? Whole grains, vegetables and fruits, and dried beans are good lower-calorie foods. They give you lots of nutrients and fiber. And they fill you up. Sweets, energy drinks, and soda pop are high in calories. They give you few nutrients and no fiber. Try to limit soda pop, fruit juice, and energy drinks. Drink water instead. Some fats can be part of a healthy diet. But cutting back on fats from highly processed foods like fast foods and many snack foods is a good way to lower the calories in your diet. Also, use smaller amounts of fats like butter, margarine, salad dressing, and mayonnaise. Add fresh garlic, lemon, or herbs to your meals to add flavor without adding fat. Meats and dairy products can be a big source of hidden fats. Try to choose lean or low-fat versions of these products. Fat-free cookies, candies, chips, and frozen treats can still be high in sugar and calories. Some fat-free foods have more calories than regular ones. Eat fat-free treats in moderation, as you would other foods. If your favorite foods are high in fat, salt, sugar, or calories, limit how often you eat them. Eat smaller servings, or look for healthy substitutes. Fill up on fruits, vegetables, and whole grains. Eating at home · Use meat as a side dish instead of as the main part of your meal. 
· Try main dishes that use whole wheat pasta, brown rice, dried beans, or vegetables. · Find ways to cook with little or no fat, such as broiling, steaming, or grilling. · Use cooking spray instead of oil. If you use oil, use a monounsaturated oil, such as canola or olive oil. · Trim fat from meats before you cook them. · Drain off fat after you brown the meat or while you roast it. · Chill soups and stews after you cook them. Then skim the fat off the top after it hardens.  
Eating out 
· Order foods that are broiled or poached rather than fried or breaded. · Cut back on the amount of butter or margarine that you use on bread. · Order sauces, gravies, and salad dressings on the side, and use only a little. · When you order pasta, choose tomato sauce rather than cream sauce. · Ask for salsa with your baked potato instead of sour cream, butter, cheese, or cassidy. · Order meals in a small size instead of upgrading to a large. · Share an entree, or take part of your food home to eat as another meal. 
· Share appetizers and desserts. Where can you learn more? Go to http://vargas-tod.info/. Enter 99 496201 in the search box to learn more about \"Learning About Cutting Calories. \" Current as of: May 12, 2017 Content Version: 11.4 © 0732-0495 [employer]. Care instructions adapted under license by [employer] (which disclaims liability or warranty for this information). If you have questions about a medical condition or this instruction, always ask your healthcare professional. [place] [address] any warranty or liability for your use of this information.

## 2018-07-09 NOTE — PROGRESS NOTES
This note will not be viewable in [address] Subjective:     MrPatrick Dan presents to the office today with complaints of extreme fatigue and diffuse aching. He notes that his mid and lower back aches along with his legs. He does have known fibromyalgia. He currently is on amitriptyline. Patient has never been on naltrexone. He notes no joint swelling or stiffness. There is been no fevers, night sweats or unexplained weight loss. He notes no spinal tenderness or radicular discomfort.     Past Medical History:   Diagnosis Date    Colorectal polyps 9/21/2017    Dyslipidemia 9/21/2017    Essential hypertension 9/21/2017    Fibromyalgia 9/21/2017    GERD with esophagitis 9/21/2017    Low testosterone 9/21/2017    Migraine headache 9/21/2017    NAFLD (nonalcoholic fatty liver disease) 9/21/2017     Past Surgical History:   Procedure Laterality Date    ABDOMEN SURGERY PROC UNLISTED      HX APPENDECTOMY      HX COLONOSCOPY       Allergies   Allergen Reactions    Penicillins Unknown (comments)     Current Outpatient Prescriptions   Medication Sig Dispense Refill    buPROPion SR (WELLBUTRIN SR) 150 mg SR tablet TAKE ONE TABLET BY MOUTH TWICE A DAY 60 Tab 2    amLODIPine (NORVASC) 5 mg tablet TAKE ONE TABLET BY MOUTH DAILY 30 Tab 2    valsartan-hydroCHLOROthiazide (DIOVAN-HCT) 320-25 mg per tablet TAKE ONE TABLET BY MOUTH DAILY 30 Tab 2    amitriptyline (ELAVIL) 50 mg tablet TAKE 1 TABLET BY MOUTH AT BEDTIME 30 Tab 11     Social History     Social History    Marital status:      Spouse name: N/A    Number of children: N/A    Years of education: N/A     Social History Main Topics    Smoking status: Never Smoker    Smokeless tobacco: Never Used    Alcohol use No    Drug use: None    Sexual activity: Not Asked     Other Topics Concern    None     Social History Narrative     Family History   Problem Relation Age of Onset    Heart Disease Father     Heart Disease Paternal Grandfather Review of Systems:  GEN: no weight loss, weight gain. Complaints of fatigue without night sweats  CV: no PND, orthopnea, or palpitations  Resp: no dyspnea on exertion, no cough  Abd: no nausea, vomiting or diarrhea  EXT: denies edema, claudication and complains of back aching and leg aching without joint swelling  Endocrine: no hair loss, excessive thirst or polyuria  Neurological ROS: no TIA or stroke symptoms  ROS otherwise negative      Objective:     Visit Vitals    /70 (BP 1 Location: Left arm, BP Patient Position: Sitting)    Pulse (!) 102    Temp 98 °F (36.7 °C) (Oral)    Ht 5' 8\" (1.727 m)    Wt 174 lb 9.6 oz (79.2 kg)    SpO2 97%    BMI 26.55 kg/m2     Body mass index is 26.55 kg/(m^2). General:   alert, cooperative and no distress   Eyes: conjunctivae/sclerae clear. PERRL, EOM's intact   Mouth:  No oral lesions, no pharyngeal erythema, no exudates   Neck: Trachea midline, no thyromegaly, no bruits   Heart: S1 and S2 normal,no murmurs noted    Lungs: Clear to auscultation bilaterally, no increased work of breathing   Abdomen: Soft, nontender. Normal bowel sounds   Extremities: No edema or cyanosis. There is no spinal tenderness to palpation I was unable to identify any trigger points. No joint swelling, synovitis was noted   Neuro: ..alert, oriented x3,speech normal in context and clarity, cranial nerves II-XII intact,motor strength: full proximally and distally,gait: normal  reflexes: full and symmetric     Physical exam otherwise negative         Assessment/Plan:     Diagnoses and all orders for this visit:    Fibromyalgia  -     COLLECTION VENOUS BLOOD,VENIPUNCTURE  -     AMB POC COMPLETE CBC,AUTOMATED ENTER  -     METABOLIC PANEL, COMPREHENSIVE  -     SED RATE (ESR)  -     TSH 3RD GENERATION  -     C REACTIVE PROTEIN, QT  -     EDITH COMPREHENSIVE PANEL  -     RHEUMATOID FACTOR, QL  -     LYME AB TOTAL W/RFLX W BLOT;  Future        Other instructions:   I suspect his symptoms are related to his fibromyalgia and we will send off some screening rheumatological studies. No change in current medications    Body mass index is 26.6 and dietary counseling along with printed patient education is given    Consider rheumatology evaluation for usage of naltrexone    Follow-up here as previously scheduled    Follow-up Disposition:  Return for As previously scheduled.     Nitesh Rehman MD

## 2018-07-09 NOTE — MR AVS SNAPSHOT
[address] [facility] [address] [id number] [phone] Patient: Jt Healy MRN: [id number] Prateek Meza Visit Information Date & Time Provider Department Dept. Phone Encounter #  
 7/9/2018  3:20 PM Yesica Bustamante MD [facility] [id number] Follow-up Instructions Return for As previously scheduled. Follow-up and Disposition History Upcoming Health Maintenance Date Due Hepatitis C Screening 1960 DTaP/Tdap/Td series (1 - Tdap) 8/18/1979 FOBT Q 1 YEAR AGE 50-75 8/18/2008 ZOSTER VACCINE AGE 60> 6/18/2018 Influenza Age 5 to Adult 8/1/2018 Allergies as of 7/9/2018  Review Complete On: 7/9/2018 By: Yesica Bustamatne MD  
  
 Severity Noted Reaction Type Reactions Penicillins  09/25/2017    Unknown (comments) Current Immunizations  Never Reviewed Name Date Influenza Vaccine (Quadrivalent) 10/25/2017 TB Skin Test (PPD) 8/12/2014 Not reviewed this visit You Were Diagnosed With   
  
 Codes Comments Fibromyalgia    -  Primary ICD-10-CM: M79.7 ICD-9-CM: 729.1 Vitals BP Pulse Temp Height(growth percentile) Weight(growth percentile) SpO2  
 118/70 (BP 1 Location: Left arm, BP Patient Position: Sitting) (!) 102 98 °F (36.7 °C) (Oral) 5' 8\" (1.727 m) 174 lb 9.6 oz (79.2 kg) 97% BMI Smoking Status 26.55 kg/m2 Never Smoker BMI and BSA Data Body Mass Index Body Surface Area  
 26.55 kg/m 2 1.95 m 2 Preferred Pharmacy Pharmacy Name Phone [address] [place], [address] [facility] [phone] Your Updated Medication List  
  
   
This list is accurate as of 7/9/18  3:42 PM.  Always use your most recent med list.  
  
  
  
  
 amitriptyline 50 mg tablet Commonly known as:  ELAVIL TAKE 1 TABLET BY MOUTH AT BEDTIME  
  
 amLODIPine 5 mg tablet Commonly known as:  Javon Parisi TAKE ONE TABLET BY MOUTH DAILY  
  
 buPROPion  mg SR tablet Commonly known as:  WELLBUTRIN SR  
TAKE ONE TABLET BY MOUTH TWICE A DAY  
  
 valsartan-hydroCHLOROthiazide 320-25 mg per tablet Commonly known as:  DIOVAN-HCT  
TAKE ONE TABLET BY MOUTH DAILY We Performed the Following AMB POC COMPLETE CBC,AUTOMATED ENTER [id number] CPT(R)] AMB POC SEDIMENTATION RATE, ERYTHROCYTE; NON AUTO [74875 CPT(R)] EDITH COMPREHENSIVE PANEL [[id number] Custom] C REACTIVE PROTEIN, QT [20820 CPT(R)] COLLECTION VENOUS BLOOD,VENIPUNCTURE [id number] CPT(R)] LYME AB TOTAL W/RFLX W BLOT [[id number] Custom] METABOLIC PANEL, COMPREHENSIVE [51196 CPT(R)] RHEUMATOID FACTOR, QL [id number] CPT(R)] TSH 3RD GENERATION [60202 CPT(R)] Follow-up Instructions Return for As previously scheduled. To-Do List   
 07/09/2018 Lab:  LYME AB TOTAL W/RFLX W BLOT Patient Instructions Fibromyalgia: Care Instructions Your Care Instructions Fibromyalgia is a painful condition that is not completely understood by medical experts. The cause of fibromyalgia is not known. It can make you feel tired and ache all over. It causes tender spots at specific points of the body that hurt only when you press on them. You may have trouble sleeping, as well as other symptoms. These problems can upset your work and home life. Symptoms tend to come and go, although they may never go away completely. Fibromyalgia does not harm your muscles, joints, or organs. Follow-up care is a key part of your treatment and safety. Be sure to make and go to all appointments, and call your doctor if you are having problems. It's also a good idea to know your test results and keep a list of the medicines you take. How can you care for yourself at home? · Exercise often. Walk, swim, or bike to help with pain and sleep problems and to make you feel better. · Try to get a good night's sleep. Go to bed and get up at the same time each day, whether you feel rested or not. Make sure you have a good mattress and pillow. · Reduce stress. Avoid things that cause you stress, if you can. If not, work at making them less stressful. Learn to use biofeedback, guided imagery, meditation, or other methods to relax. · Make healthy changes. Eat a balanced diet, quit smoking, and limit alcohol and caffeine. · Use a heating pad set on low or take warm baths or showers for pain. Using cold packs for up to 20 minutes at a time can also relieve pain. Put a thin cloth between the cold pack and your skin. A gentle massage might help too. · Be safe with medicines. Take your medicines exactly as prescribed. Call your doctor if you think you are having a problem with your medicine. Your doctor may talk to you about taking antidepressant medicines. These medicines may improve sleep, relieve pain, and in some cases treat depression. · Learn about fibromyalgia. This makes coping easier. Then, take an active role in your treatment. · Think about joining a support group with others who have fibromyalgia to learn more and get support. When should you call for help? Watch closely for changes in your health, and be sure to contact your doctor if: 
? · You feel sad, helpless, or hopeless; lose interest in things you used to enjoy; or have other symptoms of depression. ? · Your fibromyalgia symptoms get worse. Where can you learn more? Go to http://vargas-tod.info/. Enter V003 in the search box to learn more about \"Fibromyalgia: Care Instructions. \" Current as of: October 14, 2016 Content Version: 11.4 © 5328-2201 [employer]. Care instructions adapted under license by [employer] (which disclaims liability or warranty for this information).  If you have questions about a medical condition or this instruction, always ask your healthcare professional. [place] [address] any warranty or liability for your use of this information. Learning About Cutting Calories How do calories affect your weight? Food gives your body energy. Energy from the food you eat is measured in calories. This energy keeps your heart beating, your brain active, and your muscles working. Your body needs a certain number of calories each day. After your body uses the calories it needs, it stores extra calories as fat. To lose weight safely, you have to eat fewer calories while eating in a healthy way. How many calories do you need each day? The more active you are, the more calories you need. When you are less active, you need fewer calories. How many calories you need each day also depends on several things, including your age and whether you are male or female. Here are some general guidelines for adults: 
· Less active women and older adults need 1,600 to 2,000 calories each day. · Active women and less active men need 2,000 to 2,400 calories each day. · Active men need 2,400 to 3,000 calories each day. How can you cut calories and eat healthy meals? Whole grains, vegetables and fruits, and dried beans are good lower-calorie foods. They give you lots of nutrients and fiber. And they fill you up. Sweets, energy drinks, and soda pop are high in calories. They give you few nutrients and no fiber. Try to limit soda pop, fruit juice, and energy drinks. Drink water instead. Some fats can be part of a healthy diet. But cutting back on fats from highly processed foods like fast foods and many snack foods is a good way to lower the calories in your diet. Also, use smaller amounts of fats like butter, margarine, salad dressing, and mayonnaise. Add fresh garlic, lemon, or herbs to your meals to add flavor without adding fat. Meats and dairy products can be a big source of hidden fats.  Try to choose lean or low-fat versions of these products. Fat-free cookies, candies, chips, and frozen treats can still be high in sugar and calories. Some fat-free foods have more calories than regular ones. Eat fat-free treats in moderation, as you would other foods. If your favorite foods are high in fat, salt, sugar, or calories, limit how often you eat them. Eat smaller servings, or look for healthy substitutes. Fill up on fruits, vegetables, and whole grains. Eating at home · Use meat as a side dish instead of as the main part of your meal. 
· Try main dishes that use whole wheat pasta, brown rice, dried beans, or vegetables. · Find ways to cook with little or no fat, such as broiling, steaming, or grilling. · Use cooking spray instead of oil. If you use oil, use a monounsaturated oil, such as canola or olive oil. · Trim fat from meats before you cook them. · Drain off fat after you brown the meat or while you roast it. · Chill soups and stews after you cook them. Then skim the fat off the top after it hardens. Eating out · Order foods that are broiled or poached rather than fried or breaded. · Cut back on the amount of butter or margarine that you use on bread. · Order sauces, gravies, and salad dressings on the side, and use only a little. · When you order pasta, choose tomato sauce rather than cream sauce. · Ask for salsa with your baked potato instead of sour cream, butter, cheese, or cassidy. · Order meals in a small size instead of upgrading to a large. · Share an entree, or take part of your food home to eat as another meal. 
· Share appetizers and desserts. Where can you learn more? Go to http://vargas-tod.info/. Enter 99 [id number] in the search box to learn more about \"Learning About Cutting Calories. \" Current as of: May 12, 2017 Content Version: 11.4 © 2054-8460 [employer].  Care instructions adapted under license by [address] (which disclaims liability or warranty for this information). If you have questions about a medical condition or this instruction, always ask your healthcare professional. [address] any warranty or liability for your use of this information. Patient Instructions History Introducing [facility]! Rodrigo Dsouza introduces Inspirato patient portal. Now you can access parts of your medical record, email your doctor's office, and request medication refills online. 1. In your internet browser, go to [email or web address] 2. Click on the First Time User? Click Here link in the Sign In box. You will see the New Member Sign Up page. 3. Enter your Inspirato Access Code exactly as it appears below. You will not need to use this code after youve completed the sign-up process. If you do not sign up before the expiration date, you must request a new code. · Inspirato Access Code: [email or web address] Expires: 10/7/2018  3:42 PM 
 
4. Enter the last four digits of your Social Security Number (xxxx) and Date of Birth (mm/dd/yyyy) as indicated and click Submit. You will be taken to the next sign-up page. 5. Create a Inspirato ID. This will be your Inspirato login ID and cannot be changed, so think of one that is secure and easy to remember. 6. Create a Inspirato password. You can change your password at any time. 7. Enter your Password Reset Question and Answer. This can be used at a later time if you forget your password. 8. Enter your e-mail address. You will receive e-mail notification when new information is available in [address] 9. Click Sign Up. You can now view and download portions of your medical record. 10. Click the Download Summary menu link to download a portable copy of your medical information.  
 
If you have questions, please visit the Frequently Asked Questions section of the [employer]. Remember, MyChart is NOT to be used for urgent needs. For medical emergencies, dial 911. Now available from your iPhone and Android! Please provide this summary of care documentation to your next provider. Lyme Disease Testing Disclaimer:   
 § 17.8-8155.5. (Expires July 1, 2018) Lyme disease testing information disclosure. A. Every licensee or his in-office designee who orders a laboratory test for the presence of Lyme disease shall provide to the patient or his legal representative the following written information: \"ACCORDING TO THE CENTERS FOR DISEASE CONTROL AND PREVENTION, AS OF 2011 LYME DISEASE IS THE SIXTH FASTEST GROWING DISEASE IN THE [place]. YOUR HEALTH CARE PROVIDER HAS ORDERED A LABORATORY TEST FOR THE PRESENCE OF LYME DISEASE FOR YOU. CURRENT LABORATORY TESTING FOR LYME DISEASE CAN BE PROBLEMATIC AND STANDARD LABORATORY TESTS OFTEN RESULT IN FALSE NEGATIVE AND FALSE POSITIVE RESULTS, AND IF DONE TOO EARLY, YOU MAY NOT HAVE PRODUCED ENOUGH ANTIBODIES TO BE CONSIDERED POSITIVE BECAUSE YOUR IMMUNE RESPONSE REQUIRES TIME TO DEVELOP ANTIBODIES. IF YOU ARE TESTED FOR LYME DISEASE, AND THE RESULTS ARE NEGATIVE, THIS DOES NOT NECESSARILY MEAN YOU DO NOT HAVE LYME DISEASE. IF YOU CONTINUE TO EXPERIENCE SYMPTOMS, YOU SHOULD CONTACT YOUR HEALTH CARE PROVIDER AND INQUIRE ABOUT THE APPROPRIATENESS OF RETESTING OR ADDITIONAL TREATMENT. \"  
B. Licensees shall be immune from civil liability for the provision of the written information required by this section absent gross negligence or willful misconduct. Your primary care clinician is listed as INOCENTE Lynn. If you have any questions after today's visit, please call [phone].

## 2018-07-09 NOTE — PROGRESS NOTES
Malik Lloyd is a 59 y.o. male presenting for Muscle Pain  . 1. Have you been to the ER, urgent care clinic since your last visit? Hospitalized since your last visit? No    2. Have you seen or consulted any other health care providers outside of the [address] [place] [address] since your last visit? Include any pap smears or colon screening. ENT. No flowsheet data found. No flowsheet data found. PHQ over the last two weeks 9/29/2017   Little interest or pleasure in doing things Not at all   Feeling down, depressed or hopeless Not at all   Total Score PHQ 2 0       There are no discontinued medications.

## 2018-07-10 LAB
ALBUMIN SERPL-MCNC: 4.7 G/DL (ref 3.5–5.5)
ALBUMIN/GLOB SERPL: 2.5 {RATIO} (ref 1.2–2.2)
ALP SERPL-CCNC: 68 IU/L (ref 39–117)
ALT SERPL-CCNC: 22 IU/L (ref 0–44)
AST SERPL-CCNC: 27 IU/L (ref 0–40)
BILIRUB SERPL-MCNC: 0.4 MG/DL (ref 0–1.2)
BUN SERPL-MCNC: 15 MG/DL (ref 6–24)
BUN/CREAT SERPL: 13 (ref 9–20)
CALCIUM SERPL-MCNC: 10 MG/DL (ref 8.7–10.2)
CENTROMERE B AB SER-ACNC: 0.3 AI (ref 0–0.9)
CHLORIDE SERPL-SCNC: 101 MMOL/L (ref 96–106)
CHROMATIN AB SERPL-ACNC: <0.2 AI (ref 0–0.9)
CO2 SERPL-SCNC: 32 MMOL/L (ref 20–29)
CREAT SERPL-MCNC: 1.16 MG/DL (ref 0.76–1.27)
CRP SERPL-MCNC: 0.6 MG/L (ref 0–4.9)
DSDNA AB SER-ACNC: 4 IU/ML (ref 0–9)
ENA JO1 AB SER-ACNC: <0.2 AI (ref 0–0.9)
ENA RNP AB SER-ACNC: <0.2 AI (ref 0–0.9)
ENA SCL70 AB SER-ACNC: <0.2 AI (ref 0–0.9)
ENA SM AB SER-ACNC: <0.2 AI (ref 0–0.9)
ENA SS-A AB SER-ACNC: 0.3 AI (ref 0–0.9)
ENA SS-B AB SER-ACNC: <0.2 AI (ref 0–0.9)
ERYTHROCYTE [SEDIMENTATION RATE] IN BLOOD: 0 MM/HR (ref 0–10)
GLOBULIN SER CALC-MCNC: 1.9 G/DL (ref 1.5–4.5)
GLUCOSE SERPL-MCNC: 85 MG/DL (ref 65–99)
GRAN# POC: 5.8 K/UL (ref 2–7.8)
GRAN% POC: 74.6 % (ref 37–92)
HCT VFR BLD CALC: 44.2 % (ref 37–51)
HGB BLD-MCNC: 14.8 G/DL (ref 12–18)
LY# POC: 1.7 K/UL (ref 0.6–4.1)
LY% POC: 22.3 % (ref 10–58.5)
MCH RBC QN: 30.6 PG (ref 26–32)
MCHC RBC-ENTMCNC: 33.4 G/DL (ref 30–36)
MCV RBC: 92 FL (ref 80–97)
MID #, POC: 0.2 K/UL (ref 0–1.8)
MID% POC: 3.1 % (ref 0.1–24)
PLATELET # BLD: 218 K/UL (ref 140–440)
POTASSIUM SERPL-SCNC: 4.2 MMOL/L (ref 3.5–5.2)
PROT SERPL-MCNC: 6.6 G/DL (ref 6–8.5)
RBC # BLD: 4.83 M/UL (ref 4.2–6.3)
RHEUMATOID FACT SERPL-ACNC: <10 IU/ML (ref 0–13.9)
SEE BELOW, 164869: NORMAL
SODIUM SERPL-SCNC: 141 MMOL/L (ref 134–144)
TSH SERPL DL<=0.005 MIU/L-ACNC: 2.34 UIU/ML (ref 0.45–4.5)
WBC # BLD: 7.7 K/UL (ref 4.1–10.9)

## 2018-07-11 LAB — B BURGDOR IGG+IGM SER-ACNC: <0.91 ISR (ref 0–0.9)

## 2018-07-12 NOTE — PROGRESS NOTES
All labs normal. Pain likely from fibromyalgia.  May benefit fron rheumatology eval to consider trial of naltrexone

## 2018-08-01 ENCOUNTER — TELEPHONE (OUTPATIENT)
Dept: INTERNAL MEDICINE CLINIC | Age: 58
End: 2018-08-01

## 2018-08-01 NOTE — TELEPHONE ENCOUNTER
LVM for patient to let him know that his Valsartan medication has been recalled. Advised patient to call back if his medication is a lot number that has been recalled, we will call in another medication.

## 2018-08-29 RX ORDER — AMLODIPINE BESYLATE 5 MG/1
TABLET ORAL
Qty: 30 TAB | Refills: 1 | Status: SHIPPED | OUTPATIENT
Start: 2018-08-29 | End: 2018-11-04 | Stop reason: SDUPTHER

## 2018-08-30 RX ORDER — VALSARTAN AND HYDROCHLOROTHIAZIDE 320; 25 MG/1; MG/1
TABLET, FILM COATED ORAL
Qty: 30 TAB | Refills: 1 | Status: SHIPPED | OUTPATIENT
Start: 2018-08-30 | End: 2018-11-04 | Stop reason: SDUPTHER

## 2018-10-08 RX ORDER — BUPROPION HYDROCHLORIDE 150 MG/1
TABLET, EXTENDED RELEASE ORAL
Qty: 60 TAB | Refills: 1 | Status: SHIPPED | OUTPATIENT
Start: 2018-10-08 | End: 2018-12-27 | Stop reason: SDUPTHER

## 2018-10-10 RX ORDER — AMITRIPTYLINE HYDROCHLORIDE 50 MG/1
TABLET, FILM COATED ORAL
Qty: 30 TAB | Refills: 11 | Status: SHIPPED | OUTPATIENT
Start: 2018-10-10

## 2019-03-19 ENCOUNTER — HOSPITAL ENCOUNTER (EMERGENCY)
Age: 59
Discharge: HOME | End: 2019-03-19
Attending: EMERGENCY MEDICINE | Admitting: EMERGENCY MEDICINE
Payer: COMMERCIAL

## 2019-03-19 VITALS
BODY MASS INDEX: 25.01 KG/M2 | OXYGEN SATURATION: 100 % | WEIGHT: 165 LBS | RESPIRATION RATE: 14 BRPM | DIASTOLIC BLOOD PRESSURE: 91 MMHG | SYSTOLIC BLOOD PRESSURE: 129 MMHG | HEIGHT: 68 IN | HEART RATE: 113 BPM | TEMPERATURE: 98.1 F

## 2019-03-19 DIAGNOSIS — K21.00 GERD WITH ESOPHAGITIS: ICD-10-CM

## 2019-03-19 DIAGNOSIS — Z97.4 USES HEARING AID: Primary | ICD-10-CM

## 2019-03-19 DIAGNOSIS — T78.40XA ALLERGIC REACTION, INITIAL ENCOUNTER: ICD-10-CM

## 2019-03-19 LAB
ALBUMIN SERPL-MCNC: 4.2 G/DL (ref 3.5–5)
ALBUMIN/GLOB SERPL: 1.2 {RATIO} (ref 1.1–2.2)
ALP SERPL-CCNC: 78 U/L (ref 45–117)
ALT SERPL-CCNC: 38 U/L (ref 12–78)
ANION GAP SERPL CALC-SCNC: 11 MMOL/L (ref 5–15)
AST SERPL-CCNC: 24 U/L (ref 15–37)
ATRIAL RATE: 98 BPM
BASOPHILS # BLD: 0 K/UL (ref 0–0.1)
BASOPHILS NFR BLD: 0 % (ref 0–1)
BILIRUB SERPL-MCNC: 0.3 MG/DL (ref 0.2–1)
BUN SERPL-MCNC: 24 MG/DL (ref 6–20)
BUN/CREAT SERPL: 24 (ref 12–20)
CALCIUM SERPL-MCNC: 9.4 MG/DL (ref 8.5–10.1)
CALCULATED P AXIS, ECG09: 46 DEGREES
CALCULATED R AXIS, ECG10: 48 DEGREES
CALCULATED T AXIS, ECG11: 6 DEGREES
CHLORIDE SERPL-SCNC: 102 MMOL/L (ref 97–108)
CO2 SERPL-SCNC: 23 MMOL/L (ref 21–32)
CREAT SERPL-MCNC: 0.98 MG/DL (ref 0.7–1.3)
DIAGNOSIS, 93000: NORMAL
DIFFERENTIAL METHOD BLD: NORMAL
EOSINOPHIL # BLD: 0.2 K/UL (ref 0–0.4)
EOSINOPHIL NFR BLD: 2 % (ref 0–7)
ERYTHROCYTE [DISTWIDTH] IN BLOOD BY AUTOMATED COUNT: 12.3 % (ref 11.5–14.5)
GLOBULIN SER CALC-MCNC: 3.5 G/DL (ref 2–4)
GLUCOSE SERPL-MCNC: 97 MG/DL (ref 65–100)
HCT VFR BLD AUTO: 45.1 % (ref 36.6–50.3)
HGB BLD-MCNC: 15.9 G/DL (ref 12.1–17)
IMM GRANULOCYTES # BLD AUTO: 0 K/UL (ref 0–0.04)
IMM GRANULOCYTES NFR BLD AUTO: 0 % (ref 0–0.5)
LYMPHOCYTES # BLD: 2.9 K/UL (ref 0.8–3.5)
LYMPHOCYTES NFR BLD: 32 % (ref 12–49)
MCH RBC QN AUTO: 31.1 PG (ref 26–34)
MCHC RBC AUTO-ENTMCNC: 35.3 G/DL (ref 30–36.5)
MCV RBC AUTO: 88.1 FL (ref 80–99)
MONOCYTES # BLD: 0.8 K/UL (ref 0–1)
MONOCYTES NFR BLD: 9 % (ref 5–13)
NEUTS SEG # BLD: 5.3 K/UL (ref 1.8–8)
NEUTS SEG NFR BLD: 57 % (ref 32–75)
NRBC # BLD: 0 K/UL (ref 0–0.01)
NRBC BLD-RTO: 0 PER 100 WBC
P-R INTERVAL, ECG05: 148 MS
PLATELET # BLD AUTO: 217 K/UL (ref 150–400)
PMV BLD AUTO: 10.2 FL (ref 8.9–12.9)
POTASSIUM SERPL-SCNC: 3.6 MMOL/L (ref 3.5–5.1)
PROT SERPL-MCNC: 7.7 G/DL (ref 6.4–8.2)
Q-T INTERVAL, ECG07: 346 MS
QRS DURATION, ECG06: 92 MS
QTC CALCULATION (BEZET), ECG08: 441 MS
RBC # BLD AUTO: 5.12 M/UL (ref 4.1–5.7)
SODIUM SERPL-SCNC: 136 MMOL/L (ref 136–145)
TROPONIN I SERPL-MCNC: <0.05 NG/ML
VENTRICULAR RATE, ECG03: 98 BPM
WBC # BLD AUTO: 9.2 K/UL (ref 4.1–11.1)

## 2019-03-19 PROCEDURE — 80053 COMPREHEN METABOLIC PANEL: CPT

## 2019-03-19 PROCEDURE — 36415 COLL VENOUS BLD VENIPUNCTURE: CPT

## 2019-03-19 PROCEDURE — 96375 TX/PRO/DX INJ NEW DRUG ADDON: CPT

## 2019-03-19 PROCEDURE — 84484 ASSAY OF TROPONIN QUANT: CPT

## 2019-03-19 PROCEDURE — 96374 THER/PROPH/DIAG INJ IV PUSH: CPT

## 2019-03-19 PROCEDURE — 93005 ELECTROCARDIOGRAM TRACING: CPT

## 2019-03-19 PROCEDURE — 74011250636 HC RX REV CODE- 250/636: Performed by: EMERGENCY MEDICINE

## 2019-03-19 PROCEDURE — 85025 COMPLETE CBC W/AUTO DIFF WBC: CPT

## 2019-03-19 PROCEDURE — 99282 EMERGENCY DEPT VISIT SF MDM: CPT

## 2019-03-19 RX ORDER — DIPHENHYDRAMINE HYDROCHLORIDE 50 MG/ML
25 INJECTION, SOLUTION INTRAMUSCULAR; INTRAVENOUS
Status: COMPLETED | OUTPATIENT
Start: 2019-03-19 | End: 2019-03-19

## 2019-03-19 RX ORDER — FAMOTIDINE 10 MG/ML
20 INJECTION INTRAVENOUS
Status: COMPLETED | OUTPATIENT
Start: 2019-03-19 | End: 2019-03-19

## 2019-03-19 RX ADMIN — FAMOTIDINE 20 MG: 10 INJECTION, SOLUTION INTRAVENOUS at 13:13

## 2019-03-19 RX ADMIN — DIPHENHYDRAMINE HYDROCHLORIDE 25 MG: 50 INJECTION, SOLUTION INTRAMUSCULAR; INTRAVENOUS at 13:17

## 2019-03-19 RX ADMIN — METHYLPREDNISOLONE SODIUM SUCCINATE 125 MG: 125 INJECTION, POWDER, FOR SOLUTION INTRAMUSCULAR; INTRAVENOUS at 13:13

## 2019-03-19 NOTE — DISCHARGE INSTRUCTIONS
Patient Education        Allergic Reaction: Care Instructions  Your Care Instructions    An allergic reaction is an excessive response from your immune system to a medicine, chemical, food, insect bite, or other substance. A reaction can range from mild to life-threatening. Some people have a mild rash, hives, and itching or stomach cramps. In severe reactions, swelling of your tongue and throat can close up your airway so that you cannot breathe. Follow-up care is a key part of your treatment and safety. Be sure to make and go to all appointments, and call your doctor if you are having problems. It's also a good idea to know your test results and keep a list of the medicines you take. How can you care for yourself at home? · If you know what caused your allergic reaction, be sure to avoid it. Your allergy may become more severe each time you have a reaction. · Take an over-the-counter antihistamine, such as cetirizine (Zyrtec) or loratadine (Claritin), to treat mild symptoms. Read and follow directions on the label. Some antihistamines can make you feel sleepy. Do not give antihistamines to a child unless you have checked with your doctor first. Mild symptoms include sneezing or an itchy or runny nose; an itchy mouth; a few hives or mild itching; and mild nausea or stomach discomfort. · Do not scratch hives or a rash. Put a cold, moist towel on them or take cool baths to relieve itching. Put ice packs on hives, swelling, or insect stings for 10 to 15 minutes at a time. Put a thin cloth between the ice pack and your skin. Do not take hot baths or showers. They will make the itching worse. · Your doctor may prescribe a shot of epinephrine to carry with you in case you have a severe reaction. Learn how to give yourself the shot and keep it with you at all times. Make sure it is not .   · Go to the emergency room every time you have a severe reaction, even if you have used your shot of epinephrine and are feeling better. Symptoms can come back after a shot. · Wear medical alert jewelry that lists your allergies. You can buy this at most drugstores. · If your child has a severe allergy, make sure that his or her teachers, babysitters, coaches, and other caregivers know about the allergy. They should have an epinephrine shot, know how and when to give it, and have a plan to take your child to the hospital.  When should you call for help? Give an epinephrine shot if:    · You think you are having a severe allergic reaction.     · You have symptoms in more than one body area, such as mild nausea and an itchy mouth.    After giving an epinephrine shot call 911, even if you feel better.   Call 911 if:    · You have symptoms of a severe allergic reaction. These may include:  ? Sudden raised, red areas (hives) all over your body. ? Swelling of the throat, mouth, lips, or tongue. ? Trouble breathing. ? Passing out (losing consciousness). Or you may feel very lightheaded or suddenly feel weak, confused, or restless.     · You have been given an epinephrine shot, even if you feel better.    Call your doctor now or seek immediate medical care if:    · You have symptoms of an allergic reaction, such as:  ? A rash or hives (raised, red areas on the skin). ? Itching. ? Swelling. ? Belly pain, nausea, or vomiting.    Watch closely for changes in your health, and be sure to contact your doctor if:    · You do not get better as expected. Where can you learn more? Go to http://vargas-tod.info/. Enter Z750 in the search box to learn more about \"Allergic Reaction: Care Instructions. \"  Current as of: June 27, 2018  Content Version: 11.9  © 6741-0608 [employer]. Care instructions adapted under license by [employer] (which disclaims liability or warranty for this information).  If you have questions about a medical condition or this instruction, always ask your healthcare professional. [address] any warranty or liability for your use of this information. Patient Education        Upper GI Endoscopy: Before Your Procedure  What is an upper GI endoscopy? An upper gastrointestinal (or GI) endoscopy is a test that allows your doctor to look at the inside of your esophagus, stomach, and the first part of your small intestine, called the duodenum. The esophagus is the tube that carries food to your stomach. The doctor uses a thin, lighted tube that bends. It is called an endoscope, or scope. The doctor puts the tip of the scope in your mouth and gently moves it down your throat. The scope is a flexible video camera. The doctor looks at a monitor (like a TV set or a computer screen) as he or she moves the scope. A doctor may do this test, which is also called a procedure, to look for ulcers, tumors, infection, or bleeding. It also can be used to look for signs of acid backing up into your esophagus. This is called gastroesophageal reflux disease, or GERD. The doctor can use the scope to take a sample of tissue for study (a biopsy). The doctor also can use the scope to take out growths or stop bleeding. Follow-up care is a key part of your treatment and safety. Be sure to make and go to all appointments, and call your doctor if you are having problems. It's also a good idea to know your test results and keep a list of the medicines you take. What happens before the procedure?   Preparing for the procedure    · Understand exactly what procedure is planned, along with the risks, benefits, and other options. · Tell your doctors ALL the medicines, vitamins, supplements, and herbal remedies you take. Some of these can increase the risk of bleeding or interact with anesthesia.     · If you take blood thinners, such as warfarin (Coumadin), clopidogrel (Plavix), or aspirin, be sure to talk to your doctor.  He or she will tell you if you should stop taking these medicines before your procedure. Make sure that you understand exactly what your doctor wants you to do.     · Your doctor will tell you which medicines to take or stop before your procedure. You may need to stop taking certain medicines a week or more before the procedure. So talk to your doctor as soon as you can.     · If you have an advance directive, let your doctor know. It may include a living will and a durable power of  for health care. Bring a copy to the hospital. If you don't have one, you may want to prepare one. It lets your doctor and loved ones know your health care wishes. Doctors advise that everyone prepare these papers before any type of surgery or procedure. Procedures can be stressful. This information will help you understand what you can expect. And it will help you safely prepare for your procedure. What happens on the day of the procedure? · Follow the instructions exactly about when to stop eating and drinking. If you don't, your procedure may be canceled. If your doctor told you to take your medicines on the day of the procedure, take them with only a sip of water.     · Take a bath or shower before you come in for your procedure. Do not apply lotions, perfumes, deodorants, or nail polish.     · Take off all jewelry and piercings. And take out contact lenses, if you wear them.    At the hospital or surgery center   · Bring a picture ID.     · The test may take 15 to 30 minutes.     · The doctor may spray medicine on the back of your throat to numb it. You also will get medicine to prevent pain and to relax you.     · You will lie on your left side. The doctor will put the scope in your mouth and toward the back of your throat. The doctor will tell you when to swallow. This helps the scope move down your throat. You will be able to breathe normally. The doctor will move the scope down your esophagus into your stomach.  The doctor also may look at the duodenum.     · If your doctor wants to take a sample of tissue for a biopsy, he or she may use small surgical tools, which are put into the scope, to cut off some tissue. You will not feel a biopsy, if one is taken. The doctor also can use the tools to stop bleeding or to do other treatments, if needed.     · You will stay at the hospital or surgery center for 1 to 2 hours until the medicine you were given wears off. Going home   · Be sure you have someone to drive you home. Anesthesia and pain medicine make it unsafe for you to drive.     · You will be given more specific instructions about recovering from your procedure. They will cover things like diet, wound care, follow-up care, driving, and getting back to your normal routine. When should you call your doctor? · You have questions or concerns.     · You don't understand how to prepare for your procedure.     · You become ill before the procedure (such as fever, flu, or a cold).     · You need to reschedule or have changed your mind about having the procedure. Where can you learn more? Go to http://vargas-tod.info/. Enter P790 in the search box to learn more about \"Upper GI Endoscopy: Before Your Procedure. \"  Current as of: March 27, 2018  Content Version: 11.9  © 3419-5527 [employer]. Care instructions adapted under license by [employer] (which disclaims liability or warranty for this information). If you have questions about a medical condition or this instruction, always ask your healthcare professional. [place] [address] any warranty or liability for your use of this information. Patient Education        Gastroesophageal Reflux Disease (GERD): Care Instructions  Your Care Instructions    Gastroesophageal reflux disease (GERD) is the backward flow of stomach acid into the esophagus. The esophagus is the tube that leads from your throat to your stomach.  A one-way valve prevents the stomach acid from moving up into this tube. When you have GERD, this valve does not close tightly enough. If you have mild GERD symptoms including heartburn, you may be able to control the problem with antacids or over-the-counter medicine. Changing your diet, losing weight, and making other lifestyle changes can also help reduce symptoms. Follow-up care is a key part of your treatment and safety. Be sure to make and go to all appointments, and call your doctor if you are having problems. It's also a good idea to know your test results and keep a list of the medicines you take. How can you care for yourself at home? · Take your medicines exactly as prescribed. Call your doctor if you think you are having a problem with your medicine. · Your doctor may recommend over-the-counter medicine. For mild or occasional indigestion, antacids, such as Tums, Gaviscon, Mylanta, or Maalox, may help. Your doctor also may recommend over-the-counter acid reducers, such as Pepcid AC, Tagamet HB, Zantac 75, or Prilosec. Read and follow all instructions on the label. If you use these medicines often, talk with your doctor. · Change your eating habits. ? It's best to eat several small meals instead of two or three large meals. ? After you eat, wait 2 to 3 hours before you lie down. ? Chocolate, mint, and alcohol can make GERD worse. ? Spicy foods, foods that have a lot of acid (like tomatoes and oranges), and coffee can make GERD symptoms worse in some people. If your symptoms are worse after you eat a certain food, you may want to stop eating that food to see if your symptoms get better. · Do not smoke or chew tobacco. Smoking can make GERD worse. If you need help quitting, talk to your doctor about stop-smoking programs and medicines. These can increase your chances of quitting for good.   · If you have GERD symptoms at night, raise the head of your bed 6 to 8 inches by putting the frame on blocks or placing a foam wedge under the head of your mattress. (Adding extra pillows does not work.)  · Do not wear tight clothing around your middle. · Lose weight if you need to. Losing just 5 to 10 pounds can help. When should you call for help? Call your doctor now or seek immediate medical care if:    · You have new or different belly pain.     · Your stools are black and tarlike or have streaks of blood.    Watch closely for changes in your health, and be sure to contact your doctor if:    · Your symptoms have not improved after 2 days.     · Food seems to catch in your throat or chest.   Where can you learn more? Go to http://vargas-tod.info/. Enter X114 in the search box to learn more about \"Gastroesophageal Reflux Disease (GERD): Care Instructions. \"  Current as of: March 27, 2018  Content Version: 11.9  © 8168-1224 [employer]. Care instructions adapted under license by [employer] (which disclaims liability or warranty for this information). If you have questions about a medical condition or this instruction, always ask your healthcare professional. [address] any warranty or liability for your use of this information.

## 2019-03-19 NOTE — ED PROVIDER NOTES
EMERGENCY DEPARTMENT HISTORY AND PHYSICAL EXAM 
 
 
Date: 3/19/2019 Patient Name: Rosa M Dasilva History of Presenting Illness Chief Complaint Patient presents with  Allergic Reaction Pt states he has had difficulty swallowing since last night Pt states he ate tuna last night and thinks he is having an allergic reaction Pt is able to talk in sentences and breathing without difficulty at this time. History Provided By: Patient HPI: Rosa M Dasilva, 59 y.o. male with PMHx significant for fibromyalgia, HTN, GERD, and NAFLD presents ambualtory to the ED with cc of a possible allergic reaction that occurred yesterday as well as this morning. Patient states that last night he was eating shredded wheat cereal and he felt like his throat was closing. The symptoms improved in about 30-60 minutes without any medical management. Then, patient ate canned tuna this morning and experienced similar symptoms. Patient states that he is experienced similar symptoms in the past with reflux. He last had an endoscopy years ago which showed gastritis and esophagitis. He is not currently taking any medication for reflux. He denies any chest pain, shortness of breath, abdominal pain, burping. Patient also denies any history of anaphylaxis. PCP: Nikole Martinez MD 
 
There are no other complaints, changes, or physical findings at this time. Current Outpatient Medications Medication Sig Dispense Refill  buPROPion SR (WELLBUTRIN SR) 150 mg SR tablet TAKE ONE TABLET BY MOUTH TWICE A DAY 60 Tab 5  
 valsartan-hydroCHLOROthiazide (DIOVAN-HCT) 320-25 mg per tablet TAKE ONE TABLET BY MOUTH DAILY 30 Tab 6  
 amLODIPine (NORVASC) 5 mg tablet TAKE ONE TABLET BY MOUTH DAILY 30 Tab 6  
 amitriptyline (ELAVIL) 50 mg tablet TAKE 1 TABLET BY MOUTH AT BEDTIME 30 Tab 11 Past History Past Medical History: 
Past Medical History:  
Diagnosis Date  Colorectal polyps 9/21/2017  Dyslipidemia 9/21/2017  Essential hypertension 9/21/2017  Fibromyalgia 9/21/2017  GERD with esophagitis 9/21/2017  Low testosterone 9/21/2017  Migraine headache 9/21/2017  
 NAFLD (nonalcoholic fatty liver disease) 9/21/2017 Past Surgical History: 
Past Surgical History:  
Procedure Laterality Date [address] UNLISTED  HX APPENDECTOMY  HX COLONOSCOPY Family History: 
Family History Problem Relation Age of Onset  Heart Disease Father  Heart Disease Paternal Grandfather Social History: 
Social History Tobacco Use  Smoking status: Never Smoker  Smokeless tobacco: Never Used Substance Use Topics  Alcohol use: No  
 Drug use: Not on file Allergies: Allergies Allergen Reactions  Penicillins Unknown (comments) Review of Systems Review of Systems Constitutional: Negative. Negative for activity change, appetite change, chills and fever. HENT: Positive for trouble swallowing. Negative for rhinorrhea and sore throat. Eyes: Negative for pain and visual disturbance. Respiratory: Negative for cough, shortness of breath and wheezing. Cardiovascular: Negative for chest pain, palpitations and leg swelling. Gastrointestinal: Negative for abdominal pain, diarrhea, nausea and vomiting. Genitourinary: Negative for dysuria and hematuria. Musculoskeletal: Negative for arthralgias and myalgias. Skin: Negative for color change, rash and wound. Neurological: Negative for dizziness and headaches. All other systems reviewed and are negative. Physical Exam  
Physical Exam  
Constitutional: He is oriented to person, place, and time. Vital signs are normal. He appears well-developed and well-nourished. No distress. 59 y.o. male in NAD Communicates appropriately and in full sentences Normal vital signs HENT:  
Head: Normocephalic and atraumatic. Uvula is midline. Tolerating secretions. Able to communicate in full sentences. Eyes: Conjunctivae are normal. Pupils are equal, round, and reactive to light. Right eye exhibits no discharge. Left eye exhibits no discharge. Neck: Normal range of motion. Neck supple. No nuchal rigidity Cardiovascular: Normal rate, regular rhythm and intact distal pulses. Pulmonary/Chest: Effort normal and breath sounds normal. No respiratory distress. He has no wheezes. Abdominal: Soft. He exhibits no distension. There is no tenderness. Musculoskeletal: Normal range of motion. He exhibits no edema, tenderness or deformity. No neurologic or vascular compromise on exam.   
Neurological: He is alert and oriented to person, place, and time. Coordination normal.  
Skin: Skin is warm and dry. No rash (No visible rash.) noted. He is not diaphoretic. No erythema. No pallor. Psychiatric: He has a normal mood and affect. Nursing note and vitals reviewed. Diagnostic Study Results Labs - Recent Results (from the past 12 hour(s)) CBC WITH AUTOMATED DIFF Collection Time: 03/19/19  1:00 PM  
Result Value Ref Range WBC 9.2 4.1 - 11.1 K/uL  
 RBC 5.12 4.10 - 5.70 M/uL  
 HGB 15.9 12.1 - 17.0 g/dL HCT 45.1 36.6 - 50.3 % MCV 88.1 80.0 - 99.0 FL  
 MCH 31.1 26.0 - 34.0 PG  
 MCHC 35.3 30.0 - 36.5 g/dL  
 RDW 12.3 11.5 - 14.5 % PLATELET 687 858 - 325 K/uL MPV 10.2 8.9 - 12.9 FL  
 NRBC 0.0 0  WBC ABSOLUTE NRBC 0.00 0.00 - 0.01 K/uL NEUTROPHILS 57 32 - 75 % LYMPHOCYTES 32 12 - 49 % MONOCYTES 9 5 - 13 % EOSINOPHILS 2 0 - 7 % BASOPHILS 0 0 - 1 % IMMATURE GRANULOCYTES 0 0.0 - 0.5 % ABS. NEUTROPHILS 5.3 1.8 - 8.0 K/UL  
 ABS. LYMPHOCYTES 2.9 0.8 - 3.5 K/UL  
 ABS. MONOCYTES 0.8 0.0 - 1.0 K/UL  
 ABS. EOSINOPHILS 0.2 0.0 - 0.4 K/UL  
 ABS. BASOPHILS 0.0 0.0 - 0.1 K/UL  
 ABS. IMM. GRANS. 0.0 0.00 - 0.04 K/UL  
 DF AUTOMATED METABOLIC PANEL, COMPREHENSIVE  
 Collection Time: 03/19/19  1:00 PM  
Result Value Ref Range Sodium 136 136 - 145 mmol/L Potassium 3.6 3.5 - 5.1 mmol/L Chloride 102 97 - 108 mmol/L  
 CO2 23 21 - 32 mmol/L Anion gap 11 5 - 15 mmol/L Glucose 97 65 - 100 mg/dL BUN 24 (H) 6 - 20 MG/DL Creatinine 0.98 0.70 - 1.30 MG/DL  
 BUN/Creatinine ratio 24 (H) 12 - 20 GFR est AA >60 >60 ml/min/1.73m2 GFR est non-AA >60 >60 ml/min/1.73m2 Calcium 9.4 8.5 - 10.1 MG/DL Bilirubin, total 0.3 0.2 - 1.0 MG/DL  
 ALT (SGPT) 38 12 - 78 U/L  
 AST (SGOT) 24 15 - 37 U/L Alk. phosphatase 78 45 - 117 U/L Protein, total 7.7 6.4 - 8.2 g/dL Albumin 4.2 3.5 - 5.0 g/dL Globulin 3.5 2.0 - 4.0 g/dL A-G Ratio 1.2 1.1 - 2.2    
TROPONIN I Collection Time: 03/19/19  1:00 PM  
Result Value Ref Range Troponin-I, Qt. <0.05 <0.05 ng/mL EKG, 12 LEAD, INITIAL Collection Time: 03/19/19  1:04 PM  
Result Value Ref Range Ventricular Rate 98 BPM  
 Atrial Rate 98 BPM  
 P-R Interval 148 ms QRS Duration 92 ms Q-T Interval 346 ms  
 QTC Calculation (Bezet) 441 ms Calculated P Axis 46 degrees Calculated R Axis 48 degrees Calculated T Axis 6 degrees Diagnosis Normal sinus rhythm No previous ECGs available Confirmed by Richard Rene ([id number]) on 3/19/2019 5:10:21 PM 
  
 
 
 
 
Medical Decision Making I am the first provider for this patient. I reviewed the vital signs, available nursing notes, past medical history, past surgical history, family history and social history. Vital Signs-Reviewed the patient's vital signs. Patient Vitals for the past 12 hrs: 
 Temp Pulse Resp BP SpO2  
03/19/19 1250 98.1 °F (36.7 °C) (!) 113 14 (!) 129/91 100 % Records Reviewed: Nursing Notes and Old Medical Records Provider Notes (Medical Decision Making): DDX: Allergic reaction, gastritis, esophagitis, esophageal rings, esophageal narrowing. ED Course: Initial assessment performed. The patients presenting problems have been discussed, and they are in agreement with the care plan formulated and outlined with them. I have encouraged them to ask questions as they arise throughout their visit. 1:31 PM  
The plan for this patient has been made in conjunction with the Physician in Triage, Dr. Meron Hernandez MD. The patient's care was started and orders were placed while patient was in triage. Their care will be completed by this provider, Margoth Candelario PA-C.  
 
1:45 PM 
Patient states that his symptoms have completely resolved following medication administration here in the emergency department. I provided patient with GI follow-up given his symptoms have occurred with 2 food groups that he has eaten on a regular basis. Have a low suspicion for anaphylaxis at this point time. DISCHARGE NOTE: 
[email or web address] Carlos  results have been reviewed with him. He has been counseled regarding his diagnosis. He verbally conveys understanding and agreement of the signs, symptoms, diagnosis, treatment and prognosis and additionally agrees to follow up as recommended with Dr. Keri Moore MD in 24 - 48 hours. He also agrees with the care-plan and conveys that all of his questions have been answered. I have also put together some discharge instructions for him that include: 1) educational information regarding their diagnosis, 2) how to care for their diagnosis at home, as well a 3) list of reasons why they would want to return to the ED prior to their follow-up appointment, should their condition change. He and/or family's questions have been answered. I have encouraged them to see the official results in [facility] Chart\" or to retrieve the specifics of their results from medical records. PLAN: 
1. Return precautions as discussed 2. Follow-up with providers as directed 3. Medications as prescribed Return to ED if worse Diagnosis Clinical Impression: 1. Uses hearing aid 2. Allergic reaction, initial encounter 3. GERD with esophagitis Discharge Medication List as of 3/19/2019  1:56 PM  
  
 
 
Follow-up Information Follow up With Specialties Details Why Contact Info Viri Christine MD Internal Medicine Schedule an appointment as soon as possible for a visit in 2 days As needed, If symptoms worsen [address] [facility] 
[phone] Your Audiologist  Call today Randa Cooper MD Gastroenterology Schedule an appointment as soon as possible for a visit in 2 days If symptoms persist  [address] [facility] 
[phone] This note will not be viewable in [address]

## 2019-06-24 RX ORDER — VALSARTAN AND HYDROCHLOROTHIAZIDE 320; 25 MG/1; MG/1
TABLET, FILM COATED ORAL
Qty: 30 TAB | Refills: 5 | Status: SHIPPED | OUTPATIENT
Start: 2019-06-24

## 2019-06-24 RX ORDER — AMLODIPINE BESYLATE 5 MG/1
TABLET ORAL
Qty: 30 TAB | Refills: 5 | Status: SHIPPED | OUTPATIENT
Start: 2019-06-24

## 2021-04-05 ENCOUNTER — TRANSCRIBE ORDER (OUTPATIENT)
Dept: SCHEDULING | Age: 61
End: 2021-04-05

## 2021-04-05 DIAGNOSIS — Z98.890 STATUS POST ARTHROSCOPIC SURGERY OF RIGHT KNEE: Primary | ICD-10-CM

## 2021-04-06 ENCOUNTER — HOSPITAL ENCOUNTER (OUTPATIENT)
Dept: ULTRASOUND IMAGING | Age: 61
Discharge: HOME | End: 2021-04-06
Attending: ORTHOPAEDIC SURGERY
Payer: COMMERCIAL

## 2021-04-06 DIAGNOSIS — Z98.890 STATUS POST ARTHROSCOPIC SURGERY OF RIGHT KNEE: ICD-10-CM

## 2021-04-06 PROCEDURE — 93971 EXTREMITY STUDY: CPT

## 2022-03-18 PROBLEM — G43.909 MIGRAINE HEADACHE: Status: ACTIVE | Noted: 2017-09-21

## 2022-03-18 PROBLEM — I10 ESSENTIAL HYPERTENSION: Status: ACTIVE | Noted: 2017-09-21

## 2022-03-18 PROBLEM — K21.00 GERD WITH ESOPHAGITIS: Status: ACTIVE | Noted: 2017-09-21

## 2022-03-19 PROBLEM — K62.1 COLORECTAL POLYPS: Status: ACTIVE | Noted: 2017-09-21

## 2022-03-19 PROBLEM — K63.5 COLORECTAL POLYPS: Status: ACTIVE | Noted: 2017-09-21

## 2022-03-19 PROBLEM — R79.89 LOW TESTOSTERONE: Status: ACTIVE | Noted: 2017-09-21

## 2022-03-19 PROBLEM — E78.5 DYSLIPIDEMIA: Status: ACTIVE | Noted: 2017-09-21

## 2022-03-20 PROBLEM — M79.7 FIBROMYALGIA: Status: ACTIVE | Noted: 2017-09-21

## 2022-03-20 PROBLEM — K76.0 NAFLD (NONALCOHOLIC FATTY LIVER DISEASE): Status: ACTIVE | Noted: 2017-09-21
